# Patient Record
Sex: MALE | Race: WHITE
[De-identification: names, ages, dates, MRNs, and addresses within clinical notes are randomized per-mention and may not be internally consistent; named-entity substitution may affect disease eponyms.]

---

## 2020-07-23 ENCOUNTER — HOSPITAL ENCOUNTER (INPATIENT)
Dept: HOSPITAL 47 - EC | Age: 55
LOS: 6 days | Discharge: NURSING, REHAB OR LONG-TERM CARE | DRG: 871 | End: 2020-07-29
Attending: FAMILY MEDICINE | Admitting: FAMILY MEDICINE
Payer: COMMERCIAL

## 2020-07-23 DIAGNOSIS — Z87.01: ICD-10-CM

## 2020-07-23 DIAGNOSIS — E87.2: ICD-10-CM

## 2020-07-23 DIAGNOSIS — E83.42: ICD-10-CM

## 2020-07-23 DIAGNOSIS — G93.41: ICD-10-CM

## 2020-07-23 DIAGNOSIS — F17.200: ICD-10-CM

## 2020-07-23 DIAGNOSIS — E87.1: ICD-10-CM

## 2020-07-23 DIAGNOSIS — N39.0: ICD-10-CM

## 2020-07-23 DIAGNOSIS — E86.0: ICD-10-CM

## 2020-07-23 DIAGNOSIS — N17.9: ICD-10-CM

## 2020-07-23 DIAGNOSIS — Z96.649: ICD-10-CM

## 2020-07-23 DIAGNOSIS — Z87.11: ICD-10-CM

## 2020-07-23 DIAGNOSIS — Z11.59: ICD-10-CM

## 2020-07-23 DIAGNOSIS — A41.59: Primary | ICD-10-CM

## 2020-07-23 DIAGNOSIS — F10.231: ICD-10-CM

## 2020-07-23 DIAGNOSIS — G31.2: ICD-10-CM

## 2020-07-23 DIAGNOSIS — E87.6: ICD-10-CM

## 2020-07-23 DIAGNOSIS — E86.1: ICD-10-CM

## 2020-07-23 LAB
ALBUMIN SERPL-MCNC: 3.5 G/DL (ref 3.5–5)
ALP SERPL-CCNC: 73 U/L (ref 38–126)
ALT SERPL-CCNC: 33 U/L (ref 4–49)
ANION GAP SERPL CALC-SCNC: 18 MMOL/L
APTT BLD: 21.3 SEC (ref 22–30)
AST SERPL-CCNC: 42 U/L (ref 17–59)
BASOPHILS # BLD AUTO: 0.1 K/UL (ref 0–0.2)
BASOPHILS NFR BLD AUTO: 0 %
BUN SERPL-SCNC: 44 MG/DL (ref 9–20)
CALCIUM SPEC-MCNC: 8.9 MG/DL (ref 8.4–10.2)
CHLORIDE SERPL-SCNC: 83 MMOL/L (ref 98–107)
CO2 SERPL-SCNC: 26 MMOL/L (ref 22–30)
D DIMER PPP FEU-MCNC: 1.56 MG/L FEU (ref ?–0.6)
EOSINOPHIL # BLD AUTO: 0.1 K/UL (ref 0–0.7)
EOSINOPHIL NFR BLD AUTO: 1 %
ERYTHROCYTE [DISTWIDTH] IN BLOOD BY AUTOMATED COUNT: 3.69 M/UL (ref 4.3–5.9)
ERYTHROCYTE [DISTWIDTH] IN BLOOD: 13.6 % (ref 11.5–15.5)
GLUCOSE SERPL-MCNC: 114 MG/DL (ref 74–99)
HCT VFR BLD AUTO: 40.3 % (ref 39–53)
HGB BLD-MCNC: 14 GM/DL (ref 13–17.5)
HYALINE CASTS UR QL AUTO: 8 /LPF (ref 0–2)
INR PPP: 1.1 (ref ?–1.2)
LYMPHOCYTES # SPEC AUTO: 1.9 K/UL (ref 1–4.8)
LYMPHOCYTES NFR SPEC AUTO: 11 %
MAGNESIUM SPEC-SCNC: 1.3 MG/DL (ref 1.6–2.3)
MCH RBC QN AUTO: 37.9 PG (ref 25–35)
MCHC RBC AUTO-ENTMCNC: 34.7 G/DL (ref 31–37)
MCV RBC AUTO: 109.1 FL (ref 80–100)
MONOCYTES # BLD AUTO: 1.5 K/UL (ref 0–1)
MONOCYTES NFR BLD AUTO: 9 %
NEUTROPHILS # BLD AUTO: 12.9 K/UL (ref 1.3–7.7)
NEUTROPHILS NFR BLD AUTO: 78 %
PH UR: 5.5 [PH] (ref 5–8)
PLATELET # BLD AUTO: 242 K/UL (ref 150–450)
POTASSIUM SERPL-SCNC: 2.6 MMOL/L (ref 3.5–5.1)
PROT SERPL-MCNC: 7.2 G/DL (ref 6.3–8.2)
PT BLD: 11.3 SEC (ref 9–12)
SODIUM SERPL-SCNC: 127 MMOL/L (ref 137–145)
SP GR UR: 1.04 (ref 1–1.03)
SQUAMOUS UR QL AUTO: 5 /HPF (ref 0–4)
UROBILINOGEN UR QL STRIP: 3 MG/DL (ref ?–2)
WBC # BLD AUTO: 16.7 K/UL (ref 3.8–10.6)
WBC # UR AUTO: >182 /HPF (ref 0–5)

## 2020-07-23 PROCEDURE — 83880 ASSAY OF NATRIURETIC PEPTIDE: CPT

## 2020-07-23 PROCEDURE — 81001 URINALYSIS AUTO W/SCOPE: CPT

## 2020-07-23 PROCEDURE — 85025 COMPLETE CBC W/AUTO DIFF WBC: CPT

## 2020-07-23 PROCEDURE — 96374 THER/PROPH/DIAG INJ IV PUSH: CPT

## 2020-07-23 PROCEDURE — 82565 ASSAY OF CREATININE: CPT

## 2020-07-23 PROCEDURE — 84132 ASSAY OF SERUM POTASSIUM: CPT

## 2020-07-23 PROCEDURE — 84484 ASSAY OF TROPONIN QUANT: CPT

## 2020-07-23 PROCEDURE — 87086 URINE CULTURE/COLONY COUNT: CPT

## 2020-07-23 PROCEDURE — 84443 ASSAY THYROID STIM HORMONE: CPT

## 2020-07-23 PROCEDURE — 80048 BASIC METABOLIC PNL TOTAL CA: CPT

## 2020-07-23 PROCEDURE — 85379 FIBRIN DEGRADATION QUANT: CPT

## 2020-07-23 PROCEDURE — 83735 ASSAY OF MAGNESIUM: CPT

## 2020-07-23 PROCEDURE — 94640 AIRWAY INHALATION TREATMENT: CPT

## 2020-07-23 PROCEDURE — 80053 COMPREHEN METABOLIC PANEL: CPT

## 2020-07-23 PROCEDURE — 96365 THER/PROPH/DIAG IV INF INIT: CPT

## 2020-07-23 PROCEDURE — 93005 ELECTROCARDIOGRAM TRACING: CPT

## 2020-07-23 PROCEDURE — 71046 X-RAY EXAM CHEST 2 VIEWS: CPT

## 2020-07-23 PROCEDURE — 96361 HYDRATE IV INFUSION ADD-ON: CPT

## 2020-07-23 PROCEDURE — 85610 PROTHROMBIN TIME: CPT

## 2020-07-23 PROCEDURE — 99291 CRITICAL CARE FIRST HOUR: CPT

## 2020-07-23 PROCEDURE — 85027 COMPLETE CBC AUTOMATED: CPT

## 2020-07-23 PROCEDURE — 36415 COLL VENOUS BLD VENIPUNCTURE: CPT

## 2020-07-23 PROCEDURE — 96367 TX/PROPH/DG ADDL SEQ IV INF: CPT

## 2020-07-23 PROCEDURE — 87186 SC STD MICRODIL/AGAR DIL: CPT

## 2020-07-23 PROCEDURE — 96360 HYDRATION IV INFUSION INIT: CPT

## 2020-07-23 PROCEDURE — 96368 THER/DIAG CONCURRENT INF: CPT

## 2020-07-23 PROCEDURE — 96366 THER/PROPH/DIAG IV INF ADDON: CPT

## 2020-07-23 PROCEDURE — 71275 CT ANGIOGRAPHY CHEST: CPT

## 2020-07-23 PROCEDURE — 87040 BLOOD CULTURE FOR BACTERIA: CPT

## 2020-07-23 PROCEDURE — 83605 ASSAY OF LACTIC ACID: CPT

## 2020-07-23 PROCEDURE — 96376 TX/PRO/DX INJ SAME DRUG ADON: CPT

## 2020-07-23 PROCEDURE — 85730 THROMBOPLASTIN TIME PARTIAL: CPT

## 2020-07-23 PROCEDURE — 87077 CULTURE AEROBIC IDENTIFY: CPT

## 2020-07-23 RX ADMIN — POTASSIUM CHLORIDE STA MEQ: 20 TABLET, EXTENDED RELEASE ORAL at 19:12

## 2020-07-23 RX ADMIN — CEFAZOLIN SCH MLS/HR: 330 INJECTION, POWDER, FOR SOLUTION INTRAMUSCULAR; INTRAVENOUS at 21:55

## 2020-07-23 RX ADMIN — POTASSIUM CHLORIDE STA: 20 TABLET, EXTENDED RELEASE ORAL at 20:13

## 2020-07-23 NOTE — ED
General Adult HPI





- General


Chief complaint: Weakness


Stated complaint: weakness


Time Seen by Provider: 07/23/20 15:45


Source: patient, EMS, RN notes reviewed, old records reviewed


Limitations: no limitations





- History of Present Illness


Initial comments: 





This is a 54-year-old male who presents emergency department stating that he 

thinks he has grown a virus because he been short of breath over the last couple

of weeks.  Patient denies any cough.  Patient denies any chest pain or 

palpitations.  [place] and some of

those people could've had coronavirus.  Patient denies any abdominal pain.  

Patient denies nausea vomiting diarrhea.  Patient denies headache patient denies

numbness weakness per patient denies lightheadedness or dizziness.  Patient 

states he also feels a little dehydrated even though he is eating and drinking 

normally.  Patient denies any recent injury or trauma.





- Related Data


                                Home Medications











 Medication  Instructions  Recorded  Confirmed


 


No Known Home Medications  07/23/20 07/23/20











                                    Allergies











Allergy/AdvReac Type Severity Reaction Status Date / Time


 


No Known Allergies Allergy   Verified 07/23/20 17:39














Review of Systems


ROS Statement: 


Those systems with pertinent positive or pertinent negative responses have been 

documented in the HPI.





ROS Other: All systems not noted in ROS Statement are negative.





Past Medical History


Past Medical History: No Reported History


History of Any Multi-Drug Resistant Organisms: None Reported


Past Surgical History: Orthopedic Surgery


Past Psychological History: No Psychological Hx Reported


Smoking Status: Current every day smoker


Past Alcohol Use History: Occasional


Past Drug Use History: Marijuana





General Exam





- General Exam Comments


Initial Comments: 





GENERAL:


Patient is well-developed and well-nourished.  Patient is nontoxic and well-

hydrated and is in mild distress.





ENT:


Neck is soft and supple.  No significant lymphadenopathy is noted.  Oropharynx 

is clear.  Moist mucous membranes.  Neck has full range of motion without 

eliciting any pain.  





EYES:


The sclera were anicteric and conjunctiva were pink and moist.  Extraocular 

movements were intact and pupils were equal round and reactive to light.  

Eyelids were unremarkable.





PULMONARY:


Unlabored respirations.  Good breath sounds bilaterally.  No audible rales 

rhonchi or wheezing was noted.





CARDIOVASCULAR:


There is a regular rate and rhythm without any murmurs gallops or rubs. 





ABDOMEN:


Soft and nontender with normal bowel sounds. 





SKIN:


Skin is clear with no lesions or rashes and otherwise unremarkable.





NEUROLOGIC:


Patient is alert and oriented x3.  Cranial nerves II through XII are grossly 

intact.  Motor and sensory are also intact.  Normal speech, volume and content. 

Symmetrical smile.  





MUSCULOSKELETAL:


Normal extremities with adequate strength and full range of motion.  No lower 

extremity swelling or edema.  No calf tenderness.





LYMPHATICS:


No significant lymphadenopathy is noted





PSYCHIATRIC:


Normal psychiatric evaluation.  


Limitations: no limitations





Course


                                   Vital Signs











  07/23/20 07/23/20 07/23/20





  15:44 17:58 18:08


 


Temperature 98.4 F  


 


Pulse Rate 101 H 95 94


 


Respiratory 17  





Rate   


 


Blood Pressure 109/90  


 


O2 Sat by Pulse 98  





Oximetry   














Medical Decision Making





- Medical Decision Making





EKG shows sinus rhythm with occasional PAC at 99 bpm RI interval 280 QRSs 112 

QTC intervals 408 QTC is 523.  Patient's EKG shows no ST segment elevation or 

depression.





CT of the chest shows no pulmonary edema in no acute abnormality.





Patient urine came back and showed urinary tract infection at 7:45 PM.  Patient 

received antibiotics and a blood culture at this time.  Patient this time was 

considered septic.





I spoke with Dr. Hart he agreed to admit the patient admitted the patient 

wrote admitting orders continued antibiotics on the floor.





Patient received magnesium and potassium in the emergency department.  Patient's

ideal body weight is 78 kg.





- Lab Data


Result diagrams: 


                                 07/23/20 16:05





                                 07/23/20 15:50


                                   Lab Results











  07/23/20 07/23/20 07/23/20 Range/Units





  15:50 15:50 15:50 


 


WBC     (3.8-10.6)  k/uL


 


RBC     (4.30-5.90)  m/uL


 


Hgb     (13.0-17.5)  gm/dL


 


Hct     (39.0-53.0)  %


 


MCV     (80.0-100.0)  fL


 


MCH     (25.0-35.0)  pg


 


MCHC     (31.0-37.0)  g/dL


 


RDW     (11.5-15.5)  %


 


Plt Count     (150-450)  k/uL


 


Neutrophils %     %


 


Lymphocytes %     %


 


Monocytes %     %


 


Eosinophils %     %


 


Basophils %     %


 


Neutrophils #     (1.3-7.7)  k/uL


 


Lymphocytes #     (1.0-4.8)  k/uL


 


Monocytes #     (0-1.0)  k/uL


 


Eosinophils #     (0-0.7)  k/uL


 


Basophils #     (0-0.2)  k/uL


 


Manual Slide Review     


 


Poikilocytosis (manual     


 


Macrocytosis     


 


PT  11.3    (9.0-12.0)  sec


 


INR  1.1    (<1.2)  


 


APTT  21.3 L    (22.0-30.0)  sec


 


D-Dimer  1.56 H    (<0.60)  mg/L FEU


 


Sodium   127 L   (137-145)  mmol/L


 


Potassium   2.6 L*   (3.5-5.1)  mmol/L


 


Chloride   83 L   ()  mmol/L


 


Carbon Dioxide   26   (22-30)  mmol/L


 


Anion Gap   18   mmol/L


 


BUN   44 H   (9-20)  mg/dL


 


Creatinine   1.43 H   (0.66-1.25)  mg/dL


 


Est GFR (CKD-EPI)AfAm   63   (>60 ml/min/1.73 sqM)  


 


Est GFR (CKD-EPI)NonAf   55   (>60 ml/min/1.73 sqM)  


 


Glucose   114 H   (74-99)  mg/dL


 


Lactic Ac Sepsis Rflx     


 


Plasma Lactic Acid Obi    2.3 H*  (0.7-2.0)  mmol/L


 


Calcium   8.9   (8.4-10.2)  mg/dL


 


Magnesium   1.3 L   (1.6-2.3)  mg/dL


 


Total Bilirubin   1.6 H   (0.2-1.3)  mg/dL


 


AST   42   (17-59)  U/L


 


ALT   33   (4-49)  U/L


 


Alkaline Phosphatase   73   ()  U/L


 


Troponin I     (0.000-0.034)  ng/mL


 


NT-Pro-B Natriuret Pep     pg/mL


 


Total Protein   7.2   (6.3-8.2)  g/dL


 


Albumin   3.5   (3.5-5.0)  g/dL


 


Urine Color     


 


Urine Appearance     (Clear)  


 


Urine pH     (5.0-8.0)  


 


Ur Specific Gravity     (1.001-1.035)  


 


Urine Protein     (Negative)  


 


Urine Glucose (UA)     (Negative)  


 


Urine Ketones     (Negative)  


 


Urine Blood     (Negative)  


 


Urine Nitrite     (Negative)  


 


Urine Bilirubin     (Negative)  


 


Urine Urobilinogen     (<2.0)  mg/dL


 


Ur Leukocyte Esterase     (Negative)  


 


Urine WBC     (0-5)  /hpf


 


Urine WBC Clumps     (None)  /hpf


 


Ur Squamous Epith Cells     (0-4)  /hpf


 


Urine Bacteria     (None)  /hpf


 


Hyaline Casts     (0-2)  /lpf


 


Urine Mucus     (None)  /hpf














  07/23/20 07/23/20 07/23/20 Range/Units





  15:50 16:05 16:05 


 


WBC   16.7 H   (3.8-10.6)  k/uL


 


RBC   3.69 L   (4.30-5.90)  m/uL


 


Hgb   14.0   (13.0-17.5)  gm/dL


 


Hct   40.3   (39.0-53.0)  %


 


MCV   109.1 H   (80.0-100.0)  fL


 


MCH   37.9 H   (25.0-35.0)  pg


 


MCHC   34.7   (31.0-37.0)  g/dL


 


RDW   13.6   (11.5-15.5)  %


 


Plt Count   242   (150-450)  k/uL


 


Neutrophils %   78   %


 


Lymphocytes %   11   %


 


Monocytes %   9   %


 


Eosinophils %   1   %


 


Basophils %   0   %


 


Neutrophils #   12.9 H   (1.3-7.7)  k/uL


 


Lymphocytes #   1.9   (1.0-4.8)  k/uL


 


Monocytes #   1.5 H   (0-1.0)  k/uL


 


Eosinophils #   0.1   (0-0.7)  k/uL


 


Basophils #   0.1   (0-0.2)  k/uL


 


Manual Slide Review   Performed   


 


Poikilocytosis (manual   Present   


 


Macrocytosis   Marked A   


 


PT     (9.0-12.0)  sec


 


INR     (<1.2)  


 


APTT     (22.0-30.0)  sec


 


D-Dimer     (<0.60)  mg/L FEU


 


Sodium     (137-145)  mmol/L


 


Potassium     (3.5-5.1)  mmol/L


 


Chloride     ()  mmol/L


 


Carbon Dioxide     (22-30)  mmol/L


 


Anion Gap     mmol/L


 


BUN     (9-20)  mg/dL


 


Creatinine     (0.66-1.25)  mg/dL


 


Est GFR (CKD-EPI)AfAm     (>60 ml/min/1.73 sqM)  


 


Est GFR (CKD-EPI)NonAf     (>60 ml/min/1.73 sqM)  


 


Glucose     (74-99)  mg/dL


 


Lactic Ac Sepsis Rflx     


 


Plasma Lactic Acid Obi     (0.7-2.0)  mmol/L


 


Calcium     (8.4-10.2)  mg/dL


 


Magnesium     (1.6-2.3)  mg/dL


 


Total Bilirubin     (0.2-1.3)  mg/dL


 


AST     (17-59)  U/L


 


ALT     (4-49)  U/L


 


Alkaline Phosphatase     ()  U/L


 


Troponin I  0.013    (0.000-0.034)  ng/mL


 


NT-Pro-B Natriuret Pep    596  pg/mL


 


Total Protein     (6.3-8.2)  g/dL


 


Albumin     (3.5-5.0)  g/dL


 


Urine Color     


 


Urine Appearance     (Clear)  


 


Urine pH     (5.0-8.0)  


 


Ur Specific Gravity     (1.001-1.035)  


 


Urine Protein     (Negative)  


 


Urine Glucose (UA)     (Negative)  


 


Urine Ketones     (Negative)  


 


Urine Blood     (Negative)  


 


Urine Nitrite     (Negative)  


 


Urine Bilirubin     (Negative)  


 


Urine Urobilinogen     (<2.0)  mg/dL


 


Ur Leukocyte Esterase     (Negative)  


 


Urine WBC     (0-5)  /hpf


 


Urine WBC Clumps     (None)  /hpf


 


Ur Squamous Epith Cells     (0-4)  /hpf


 


Urine Bacteria     (None)  /hpf


 


Hyaline Casts     (0-2)  /lpf


 


Urine Mucus     (None)  /hpf














  07/23/20 07/23/20 07/23/20 Range/Units





  16:43 19:07 19:24 


 


WBC     (3.8-10.6)  k/uL


 


RBC     (4.30-5.90)  m/uL


 


Hgb     (13.0-17.5)  gm/dL


 


Hct     (39.0-53.0)  %


 


MCV     (80.0-100.0)  fL


 


MCH     (25.0-35.0)  pg


 


MCHC     (31.0-37.0)  g/dL


 


RDW     (11.5-15.5)  %


 


Plt Count     (150-450)  k/uL


 


Neutrophils %     %


 


Lymphocytes %     %


 


Monocytes %     %


 


Eosinophils %     %


 


Basophils %     %


 


Neutrophils #     (1.3-7.7)  k/uL


 


Lymphocytes #     (1.0-4.8)  k/uL


 


Monocytes #     (0-1.0)  k/uL


 


Eosinophils #     (0-0.7)  k/uL


 


Basophils #     (0-0.2)  k/uL


 


Manual Slide Review     


 


Poikilocytosis (manual     


 


Macrocytosis     


 


PT     (9.0-12.0)  sec


 


INR     (<1.2)  


 


APTT     (22.0-30.0)  sec


 


D-Dimer     (<0.60)  mg/L FEU


 


Sodium     (137-145)  mmol/L


 


Potassium     (3.5-5.1)  mmol/L


 


Chloride     ()  mmol/L


 


Carbon Dioxide     (22-30)  mmol/L


 


Anion Gap     mmol/L


 


BUN     (9-20)  mg/dL


 


Creatinine     (0.66-1.25)  mg/dL


 


Est GFR (CKD-EPI)AfAm     (>60 ml/min/1.73 sqM)  


 


Est GFR (CKD-EPI)NonAf     (>60 ml/min/1.73 sqM)  


 


Glucose     (74-99)  mg/dL


 


Lactic Ac Sepsis Rflx  Y    


 


Plasma Lactic Acid Obi   1.6   (0.7-2.0)  mmol/L


 


Calcium     (8.4-10.2)  mg/dL


 


Magnesium     (1.6-2.3)  mg/dL


 


Total Bilirubin     (0.2-1.3)  mg/dL


 


AST     (17-59)  U/L


 


ALT     (4-49)  U/L


 


Alkaline Phosphatase     ()  U/L


 


Troponin I     (0.000-0.034)  ng/mL


 


NT-Pro-B Natriuret Pep     pg/mL


 


Total Protein     (6.3-8.2)  g/dL


 


Albumin     (3.5-5.0)  g/dL


 


Urine Color    Yellow  


 


Urine Appearance    Cloudy  (Clear)  


 


Urine pH    5.5  (5.0-8.0)  


 


Ur Specific Gravity    1.040 H  (1.001-1.035)  


 


Urine Protein    Trace H  (Negative)  


 


Urine Glucose (UA)    Negative  (Negative)  


 


Urine Ketones    Trace H  (Negative)  


 


Urine Blood    Small H  (Negative)  


 


Urine Nitrite    Negative  (Negative)  


 


Urine Bilirubin    Negative  (Negative)  


 


Urine Urobilinogen    3.0  (<2.0)  mg/dL


 


Ur Leukocyte Esterase    Large H  (Negative)  


 


Urine WBC    >182 H  (0-5)  /hpf


 


Urine WBC Clumps    Many H  (None)  /hpf


 


Ur Squamous Epith Cells    5 H  (0-4)  /hpf


 


Urine Bacteria    Occasional H  (None)  /hpf


 


Hyaline Casts    8 H  (0-2)  /lpf


 


Urine Mucus    Rare H  (None)  /hpf














Critical Care Time


Critical Care Time: Yes


Total Critical Care Time: 35





Disposition


Clinical Impression: 


 Urinary tract infection, Sepsis, Hypokalemia, Hypomagnesemia





Disposition: ADMITTED AS IP TO THIS HOSP


Referrals: 


None,Stated [Primary Care Provider] - 1-2 days


Time of Disposition: 20:25

## 2020-07-23 NOTE — CT
EXAMINATION TYPE: CT chest angio for PE with contrast and with 3-D reconstruction renderings

 

DATE OF EXAM: 7/23/2020

 

COMPARISON: Chest x-ray 7/23/2020

 

HISTORY: elevated d-dimer, SOB, fatigue

 

TECHNIQUE: Departmental protocol. Automated exposure control for dose reduction was used.

CT DLP: 476.8 mGycm

 

CONTRAST: CT Chest for pulmonary embolism performed with with IV Contrast, patient injected with 73cc
 mL of Isovue 370.

 

FINDINGS:

 

LUNGS: The airways unremarkable. The left lung is clear and well expanded. The right lung shows scatt
ered ill-defined bands of added opacity in the upper and mid and lower lung zone, most notable in the
 lower lung zone. These are consistent with pulmonary scarring changes. If there is an outside prior 
CT, with advise direct comparison for an addendum to be made to this report. If none exist, then six-
month follow-up CT characterization is recommended.

 

PLEURAL SPACES: There is no pleural effusion or pneumothorax seen.  

 

MEDIASTINUM: There is satisfactory enhancement of the pulmonary artery and its branches, without defi
nite CT evidence of pulmonary embolism. The aorta is unremarkable. There is evidence of moderate left
 ventricular hypertrophy. There is no cardiomegaly or pericardial effusion, but prominent left and ri
ght coronary calcifications are demonstrated. There are no greater than 1 cm hilar or mediastinal lym
ph nodes.   No pericardial effusion is seen.  

 

OTHER:  No additional significant abnormality is seen.

 

IMPRESSION:

1.  Negative for pulmonary embolism.

2.  No acute pulmonary/pleural process/mediastinal.

3.  Coronary calcifications with left ventricular hypertrophy.

4.  Right lung parenchyma scarring, with six-month follow-up chest CT is advised unless direct compar
maeve can be made to a prior CT.

## 2020-07-23 NOTE — XR
EXAMINATION: XR chest 2V

DATE AND TIME:  7/23/2020 4:58 PM

 

CLINICAL INDICATION: PHH; difficulty breathing

 

TECHNIQUE: Departmental protocol

 

COMPARISON: None

 

FINDINGS: 

The lungs appear negative for acute processes.

 

There appear to be two surgical clips which reject within the right lower lobe posterolaterally.

 

The right pleural space shows blunting of the costophrenic angle on the right, with differential incl
uding pleural thickening and/or scarring, versus small pleural effusion. However, the lateral view do
es not confirm pleural effusion and, therefore, the finding is likely the former.

 

The cardiac silhouette is not enlarged. The remainder of the mediastinal silhouette is unremarkable. 


 

The skeletal structures and soft tissues are negative for acute findings.

 

IMPRESSION:

1. No acute pulmonary/pleural process.

2. Subtle right pleural space finding, likely representing peripheral thickening and/or scarring, edd
tomically related to surgical clips.

## 2020-07-24 RX ADMIN — CEFAZOLIN SCH MLS/HR: 330 INJECTION, POWDER, FOR SOLUTION INTRAMUSCULAR; INTRAVENOUS at 20:13

## 2020-07-24 RX ADMIN — SODIUM CHLORIDE SCH MLS/HR: 9 INJECTION, SOLUTION INTRAVENOUS at 23:36

## 2020-07-24 RX ADMIN — CEFAZOLIN SCH MLS/HR: 330 INJECTION, POWDER, FOR SOLUTION INTRAMUSCULAR; INTRAVENOUS at 05:53

## 2020-07-24 RX ADMIN — SODIUM CHLORIDE SCH MLS/HR: 9 INJECTION, SOLUTION INTRAVENOUS at 10:05

## 2020-07-24 RX ADMIN — CEFAZOLIN SCH: 330 INJECTION, POWDER, FOR SOLUTION INTRAMUSCULAR; INTRAVENOUS at 17:46

## 2020-07-24 NOTE — HP
HISTORY AND PHYSICAL



A 54-year-old white male presents to the ER, short of breath over the last couple

weeks, thought he had a virus.  Denies any cough.  CT of the chest was negative.  He

was around a lot of people that could have coronavirus.  Denies abdominal pain, nausea,

vomiting, diarrhea.  He denied numbness or weakness or lightheaded,  dizziness.  He

feels a little dehydrated.



HOME MEDICINES:

Negative.



ALLERGIES:

Negative.



Current every day smoker.  Occasional marijuana.



REVIEW OF SYSTEMS:

Fourteen-point review of systems negative.



PHYSICAL EXAMINATION:

Well developed, well nourished.

HEENT: Neck supple.  No mass.  Pupils equal, round, reactive.

LUNGS:  Good breath sounds.  No audible wheezes.

CARDIAC:  Regular rate and rhythm.

ABDOMEN:  Soft.

SKIN:  Warm and dry.

Cranial nerves are intact neurologically.

PSYCH: Fair mood and affect.



Pulse is 95 to 100, temp 98.4, respiratory rate 16 to 18, blood pressure 109/90, O2

saturation 98.



EKG sinus rhythm, no ST changes.  CTA chest, no PE.  UA shows severely high amount of

white cells over 120.  Blood cultures are being done.



ASSESSMENT:

1. Urosepsis.

2. Hypokalemia.

3. Lactic acidosis.

4. Hyponatremia.

5. Leukocytosis secondary to urosepsis.

6. Prerenal renal insufficiency with dehydration.

Broad-spectrum antibiotics, fluids.  Monitor labs.  Infectious Disease consult.

Possible discharge if patient improves in the next couple days.  He has sepsis due to

UTI, hypokalemia, hypomagnesemia, replace electrolytes.





MMODL / IJN: [id number] / Job#: [id number]

## 2020-07-25 LAB
ANION GAP SERPL CALC-SCNC: 9 MMOL/L
BUN SERPL-SCNC: 21 MG/DL (ref 9–20)
CALCIUM SPEC-MCNC: 8.3 MG/DL (ref 8.4–10.2)
CHLORIDE SERPL-SCNC: 99 MMOL/L (ref 98–107)
CO2 SERPL-SCNC: 25 MMOL/L (ref 22–30)
GLUCOSE SERPL-MCNC: 80 MG/DL (ref 74–99)
MAGNESIUM SPEC-SCNC: 1.4 MG/DL (ref 1.6–2.3)
POTASSIUM SERPL-SCNC: 2.8 MMOL/L (ref 3.5–5.1)
SODIUM SERPL-SCNC: 133 MMOL/L (ref 137–145)

## 2020-07-25 RX ADMIN — CEFAZOLIN SCH MLS/HR: 330 INJECTION, POWDER, FOR SOLUTION INTRAMUSCULAR; INTRAVENOUS at 11:51

## 2020-07-25 RX ADMIN — POTASSIUM CHLORIDE SCH MEQ: 20 TABLET, EXTENDED RELEASE ORAL at 16:09

## 2020-07-25 RX ADMIN — MAGNESIUM SULFATE IN DEXTROSE SCH MLS/HR: 10 INJECTION, SOLUTION INTRAVENOUS at 14:43

## 2020-07-25 RX ADMIN — CEFAZOLIN SCH MLS/HR: 330 INJECTION, POWDER, FOR SOLUTION INTRAMUSCULAR; INTRAVENOUS at 03:51

## 2020-07-25 RX ADMIN — CEFAZOLIN SCH MLS/HR: 330 INJECTION, POWDER, FOR SOLUTION INTRAMUSCULAR; INTRAVENOUS at 17:43

## 2020-07-25 RX ADMIN — MAGNESIUM SULFATE IN DEXTROSE SCH MLS/HR: 10 INJECTION, SOLUTION INTRAVENOUS at 17:39

## 2020-07-25 RX ADMIN — Medication SCH MG: at 16:09

## 2020-07-25 RX ADMIN — POTASSIUM CHLORIDE SCH MEQ: 20 TABLET, EXTENDED RELEASE ORAL at 14:43

## 2020-07-25 RX ADMIN — MAGNESIUM SULFATE IN DEXTROSE SCH MLS/HR: 10 INJECTION, SOLUTION INTRAVENOUS at 16:09

## 2020-07-25 NOTE — P.PN
Subjective


Progress Note Date: 07/25/20


Principal diagnosis: 





this is a 54-year-old male who was recently admitted with shortness of breath 

and possible acute urinary tract infection and is being closely monitored.  

Cultures preliminary showing gram-negative bacilli and currently maintained on 

IV antibiotics in the form of ceftriaxone and will continue at this time. 

infectious disease is following. patient is also found to be hypokalemic and 

have hypomagnesemia and currently being replaced.  Will repeat a.m. labs and 

monitor closely. only patient denies any chest pain, worsening shortness of 

breath, or palpitations.  patient is afebrile. no reports of nausea or vomiting 

and patient is tolerating diet.





Objective





- Vital Signs


Vital signs: 


                                   Vital Signs











Temp  97.6 F   07/25/20 07:53


 


Pulse  101 H  07/25/20 07:53


 


Resp  18   07/25/20 07:53


 


BP  169/101   07/25/20 07:53


 


Pulse Ox  97   07/25/20 07:53








                                 Intake & Output











 07/24/20 07/25/20 07/25/20





 18:59 06:59 18:59


 


Intake Total  1800 


 


Output Total  3 


 


Balance  1797 


 


Weight 127.006 kg  


 


Intake:   


 


  Intake, IV Titration  1500 





  Amount   


 


    Sodium Chloride 0.9% 1,  1000 





    000 ml @ 130 mls/hr IV .   





    Q7H42M [facility] Rx#:[id number]   


 


    Vancomycin 2,000 mg In  500 





    Sodium Chloride 0.9% 500   





    ml 500 ml @ 167 mls/hr   





    IVPB Q16H [facility]AC[facility] Rx#:   





    [id number]   


 


  Oral  300 


 


Output:   


 


  Urine  3 


 


Other:   


 


  Voiding Method   Incontinent


 


  # Voids  3 














- Exam





Gen: This is a 54-year-old male sitting up in bed, awake, alert and oriented 3,

well-developed, well-nourished.


HEENT: Head is atraumatic, normocephalic. Pupils equal, round. Sclerae is 

anicteric. 


NECK: Supple. No JVD. No lymphadenopathy. No thyromegaly. 


LUNGS: diminished breath sounds bilaterally noted with no wheezing or rhonchi 

noted.  No intercostal retractions.


HEART: S1, S2 


ABDOMEN: Soft. Bowel sounds are present. No masses.  No tenderness.


EXTREMITIES: No pedal edema.  No calf tenderness.


NEUROLOGICAL: Patient is awake, alert and oriented x3. Cranial nerves 2 through 

12 are grossly intact. 





- Labs


CBC & Chem 7: 


                                 07/23/20 16:05





                                 07/25/20 08:14


Labs: 


                  Abnormal Lab Results - Last 24 Hours (Table)











  07/25/20 Range/Units





  08:14 


 


Sodium  133 L  (137-145)  mmol/L


 


Potassium  2.8 L  (3.5-5.1)  mmol/L


 


BUN  21 H  (9-20)  mg/dL


 


Calcium  8.3 L  (8.4-10.2)  mg/dL


 


Magnesium  1.4 L  (1.6-2.3)  mg/dL








                      Microbiology - Last 24 Hours (Table)











 07/23/20 19:24 Urine Culture - Preliminary





 Urine,Voided    Gram Neg Bacilli


 


 07/23/20 17:43 Blood Culture - Preliminary





 Blood    No Growth after 24 hours














Assessment and Plan


Assessment: 





urosepsis, present on admission


Hypokalemia


Lactic acidosis


Hyponatremia


hypomagnesemia


Leukocytosis secondary to urosepsis


Prerenal renal insufficiency with dehydration





plan:


Continue current medications, management, and symptomatic treatment.  continue 

to monitor electrolytes and replace as needed. will repeat a.m. labs. continue 

with IV fluid hydration.  Further recommendations to follow.

## 2020-07-25 NOTE — P.CONS
History of Present Illness





- Reason for Consult


Consult date: 07/24/20


Urosepsis


Requesting physician: Uziel Hart





- Chief Complaint


Weakness x few days





- History of Present Illness


Patient is a 54-year-old  male presenting to the hospital with 

generalized weakness and thinking he may have corona virus as he was marked with

a bunch of people that have been infected however the patient denies having any 

fever or any chills on arrival to ER no shortness of breath no cough patient on 

arrival to the ER was noticed to be afebrile he did have elevated white count of

16,000 with a left shift no lymphopenia elevated d-dimer he did have a positive 

UA patient chest x-ray was negative subsequently did have CT angiogram that was 

negative for PE and did not show any groundglass opacities patient did have a 

nasopharyngeal swab which came back negative as well , Patient has been 

diagnosed with alcohol withdrawal DTs and has been started on Ativan, patient 

has received a few doses and at time of evaluation was completely out and was 

unable to provide any history so most information has been obtained from review 

the chart patient has been started on Rocephin and vancomycin and infectious 

disease was consulted with concern for possible urosepsis








Review of Systems


Positive points has been mentioned in HPI complete review could not be obtained 

because of his underlying mental status








Past Medical History


Past Medical History: Pneumonia


Additional Past Medical History / Comment(s): gastric ulcer, ETOH-liquor, beer, 

daily


History of Any Multi-Drug Resistant Organisms: None Reported


Past Surgical History: Joint Replacement, Orthopedic Surgery


Additional Past Surgical History / Comment(s): total hip (unsure of which one), 

closed reductions, recent weight loss


Past Psychological History: No Psychological Hx Reported


Smoking Status: Current every day smoker


Past Alcohol Use History: Occasional


Past Drug Use History: Marijuana





Medications and Allergies


                                Home Medications











 Medication  Instructions  Recorded  Confirmed  Type


 


No Known Home Medications  07/23/20 07/23/20 History








                                    Allergies











Allergy/AdvReac Type Severity Reaction Status Date / Time


 


No Known Allergies Allergy   Verified 07/23/20 17:39














Physical Exam


Vitals: 


                                   Vital Signs











  Temp Pulse Pulse Resp BP BP Pulse Ox


 


 07/24/20 13:22  97.8 F   100  16   103/68  96


 


 07/24/20 11:15  96.9 F L   101 H  16   166/122  97


 


 07/24/20 08:50    99  16   157/101  97


 


 07/24/20 07:50  97.4 F L      


 


 07/24/20 02:30   91   18  127/84   98


 


 07/23/20 21:58  98.3 F  90   18  155/95   97


 


 07/23/20 18:08   94     


 


 07/23/20 17:58   95     


 


 07/23/20 15:44  98.4 F  101 H   17  109/90   98








                                Intake and Output











 07/24/20 07/24/20 07/24/20





 06:59 14:59 22:59


 


Other:   


 


  Weight  127.006 kg 











GENERAL DESCRIPTION: Middle-aged male lying in bed, no distress. No tachypnea or

accessory muscle of respiration use.


HEENT: Shows Pallor , no scleral icterus. Oral mucous membrane is dry. No 

pharyngeal erythema or thrush


NECK: Trachea central, no thyromegaly.


LUNGS: Unlabored breathing. Clear to auscultation anteriorly. No wheeze or 

crackle.


HEART: S1, S2, regular rate and rhythm. No loud murmur


ABDOMEN: Soft, no tenderness , guarding or rigidity, no organomegaly


EXTREMITIES: No edema of feet.


SKIN: No rash, no masses palpable.


NEUROLOGICAL: The patient is sedated orientation could not be determined

















Results


CBC & Chem 7: 


                                 07/23/20 16:05





                                 07/23/20 15:50


Labs: 


                  Abnormal Lab Results - Last 24 Hours (Table)











  07/23/20 07/23/20 07/23/20 Range/Units





  15:50 15:50 15:50 


 


WBC     (3.8-10.6)  k/uL


 


RBC     (4.30-5.90)  m/uL


 


MCV     (80.0-100.0)  fL


 


MCH     (25.0-35.0)  pg


 


Neutrophils #     (1.3-7.7)  k/uL


 


Monocytes #     (0-1.0)  k/uL


 


Macrocytosis     


 


APTT  21.3 L    (22.0-30.0)  sec


 


D-Dimer  1.56 H    (<0.60)  mg/L FEU


 


Sodium   127 L   (137-145)  mmol/L


 


Potassium   2.6 L*   (3.5-5.1)  mmol/L


 


Chloride   83 L   ()  mmol/L


 


BUN   44 H   (9-20)  mg/dL


 


Creatinine   1.43 H   (0.66-1.25)  mg/dL


 


Glucose   114 H   (74-99)  mg/dL


 


Plasma Lactic Acid Obi    2.3 H*  (0.7-2.0)  mmol/L


 


Magnesium   1.3 L   (1.6-2.3)  mg/dL


 


Total Bilirubin   1.6 H   (0.2-1.3)  mg/dL


 


Ur Specific Gravity     (1.001-1.035)  


 


Urine Protein     (Negative)  


 


Urine Ketones     (Negative)  


 


Urine Blood     (Negative)  


 


Ur Leukocyte Esterase     (Negative)  


 


Urine WBC     (0-5)  /hpf


 


Urine WBC Clumps     (None)  /hpf


 


Ur Squamous Epith Cells     (0-4)  /hpf


 


Urine Bacteria     (None)  /hpf


 


Hyaline Casts     (0-2)  /lpf


 


Urine Mucus     (None)  /hpf














  07/23/20 07/23/20 Range/Units





  16:05 19:24 


 


WBC  16.7 H   (3.8-10.6)  k/uL


 


RBC  3.69 L   (4.30-5.90)  m/uL


 


MCV  109.1 H   (80.0-100.0)  fL


 


MCH  37.9 H   (25.0-35.0)  pg


 


Neutrophils #  12.9 H   (1.3-7.7)  k/uL


 


Monocytes #  1.5 H   (0-1.0)  k/uL


 


Macrocytosis  Marked A   


 


APTT    (22.0-30.0)  sec


 


D-Dimer    (<0.60)  mg/L FEU


 


Sodium    (137-145)  mmol/L


 


Potassium    (3.5-5.1)  mmol/L


 


Chloride    ()  mmol/L


 


BUN    (9-20)  mg/dL


 


Creatinine    (0.66-1.25)  mg/dL


 


Glucose    (74-99)  mg/dL


 


Plasma Lactic Acid Obi    (0.7-2.0)  mmol/L


 


Magnesium    (1.6-2.3)  mg/dL


 


Total Bilirubin    (0.2-1.3)  mg/dL


 


Ur Specific Gravity   1.040 H  (1.001-1.035)  


 


Urine Protein   Trace H  (Negative)  


 


Urine Ketones   Trace H  (Negative)  


 


Urine Blood   Small H  (Negative)  


 


Ur Leukocyte Esterase   Large H  (Negative)  


 


Urine WBC   >182 H  (0-5)  /hpf


 


Urine WBC Clumps   Many H  (None)  /hpf


 


Ur Squamous Epith Cells   5 H  (0-4)  /hpf


 


Urine Bacteria   Occasional H  (None)  /hpf


 


Hyaline Casts   8 H  (0-2)  /lpf


 


Urine Mucus   Rare H  (None)  /hpf








                      Microbiology - Last 24 Hours (Table)











 07/23/20 19:24 Urine Culture - Preliminary





 Urine,Voided 














Assessment and Plan


Assessment: 


1- patient presented to the hospital with weakness in this patient did have 

significantly elevated white count he did have a positive UA with concern for 

symptomatic urinary tract infection patient currently undergoing DTs and has 

been sedated cerebral for a history however workup so far including a chest x-

ray and CT angiogram that has been negative for PE or pneumonia abdominal soft 

with clinical examination and no evidence of any cellulitis





(1) Urinary tract infection


Current Visit: Yes   Status: Acute   Code(s): N39.0 - URINARY TRACT INFECTION, 

SITE NOT SPECIFIED   SNOMED Code(s): 56969083


   


Plan: 


1- Rocephin 1 g IV piggyback daily


2- discontinue vancomycin


3- IV fluids


We will follow on clinical condition and cultures to further adjust medication 

if needed


Thank you for this consultation will follow this patient with you





Time with Patient: Greater than 30

## 2020-07-26 LAB
ANION GAP SERPL CALC-SCNC: 9 MMOL/L
BUN SERPL-SCNC: 13 MG/DL (ref 9–20)
CALCIUM SPEC-MCNC: 8.1 MG/DL (ref 8.4–10.2)
CHLORIDE SERPL-SCNC: 104 MMOL/L (ref 98–107)
CO2 SERPL-SCNC: 21 MMOL/L (ref 22–30)
ERYTHROCYTE [DISTWIDTH] IN BLOOD BY AUTOMATED COUNT: 4.47 M/UL (ref 4.3–5.9)
ERYTHROCYTE [DISTWIDTH] IN BLOOD: 13.8 % (ref 11.5–15.5)
GLUCOSE SERPL-MCNC: 61 MG/DL (ref 74–99)
HCT VFR BLD AUTO: 51.3 % (ref 39–53)
HGB BLD-MCNC: 17.7 GM/DL (ref 13–17.5)
MAGNESIUM SPEC-SCNC: 1.6 MG/DL (ref 1.6–2.3)
MCH RBC QN AUTO: 39.6 PG (ref 25–35)
MCHC RBC AUTO-ENTMCNC: 34.5 G/DL (ref 31–37)
MCV RBC AUTO: 114.8 FL (ref 80–100)
PLATELET # BLD AUTO: 117 K/UL (ref 150–450)
POTASSIUM SERPL-SCNC: 4 MMOL/L (ref 3.5–5.1)
SODIUM SERPL-SCNC: 134 MMOL/L (ref 137–145)
WBC # BLD AUTO: 8.7 K/UL (ref 3.8–10.6)

## 2020-07-26 RX ADMIN — CEFAZOLIN SCH MLS/HR: 330 INJECTION, POWDER, FOR SOLUTION INTRAMUSCULAR; INTRAVENOUS at 15:03

## 2020-07-26 RX ADMIN — Medication SCH MG: at 08:48

## 2020-07-26 RX ADMIN — Medication SCH MG: at 17:26

## 2020-07-26 RX ADMIN — MAGNESIUM SULFATE IN DEXTROSE SCH MLS/HR: 10 INJECTION, SOLUTION INTRAVENOUS at 10:52

## 2020-07-26 RX ADMIN — ACETAMINOPHEN PRN MG: 325 TABLET, FILM COATED ORAL at 09:01

## 2020-07-26 RX ADMIN — MAGNESIUM SULFATE IN DEXTROSE SCH MLS/HR: 10 INJECTION, SOLUTION INTRAVENOUS at 12:34

## 2020-07-26 RX ADMIN — CEFAZOLIN SCH MLS/HR: 330 INJECTION, POWDER, FOR SOLUTION INTRAMUSCULAR; INTRAVENOUS at 06:58

## 2020-07-26 NOTE — PN
PROGRESS NOTE



DATE OF SERVICE:

07/25/2020



REASON FOR FOLLOWUP:

Urinary tract infection.



INTERVAL HISTORY:

Patient is currently afebrile.  The patient seemed to be slightly more awake and alert

today. He is breathing comfortably.  Denies any headache.  No chest pain.  No abdominal

pain or diarrhea.



PHYSICAL EXAMINATION:

Blood pressure 165/89 with a pulse of 87, temperature 98.1, he is 98% on room air.

General description is a middle-aged male lying in bed in no distress. Respiratory

system: Unlabored breathing. Clear to auscultation anteriorly. Heart S1, S2, regular

rate and rhythm. Abdomen is soft, no tenderness.



LABS:

Urine showing gram-negative.



DIAGNOSTIC IMPRESSION AND PLAN:

Patient with gram-negative urinary tract infection.  Patient is covered with Rocephin.

To continue adjusting further based on culture report and monitor clinical course

closely.





MMODL / IJN: [id number] / Job#: [id number]

## 2020-07-27 LAB
ALBUMIN SERPL-MCNC: 2.4 G/DL (ref 3.5–5)
ALP SERPL-CCNC: 53 U/L (ref 38–126)
ALT SERPL-CCNC: 21 U/L (ref 4–49)
ANION GAP SERPL CALC-SCNC: 7 MMOL/L
AST SERPL-CCNC: 35 U/L (ref 17–59)
BASOPHILS # BLD AUTO: 0 K/UL (ref 0–0.2)
BASOPHILS NFR BLD AUTO: 1 %
BUN SERPL-SCNC: 8 MG/DL (ref 9–20)
CALCIUM SPEC-MCNC: 7.9 MG/DL (ref 8.4–10.2)
CHLORIDE SERPL-SCNC: 101 MMOL/L (ref 98–107)
CO2 SERPL-SCNC: 26 MMOL/L (ref 22–30)
EOSINOPHIL # BLD AUTO: 0.2 K/UL (ref 0–0.7)
EOSINOPHIL NFR BLD AUTO: 3 %
ERYTHROCYTE [DISTWIDTH] IN BLOOD BY AUTOMATED COUNT: 3.17 M/UL (ref 4.3–5.9)
ERYTHROCYTE [DISTWIDTH] IN BLOOD: 13.9 % (ref 11.5–15.5)
GLUCOSE SERPL-MCNC: 65 MG/DL (ref 74–99)
HCT VFR BLD AUTO: 36.5 % (ref 39–53)
HGB BLD-MCNC: 12.5 GM/DL (ref 13–17.5)
LYMPHOCYTES # SPEC AUTO: 1.7 K/UL (ref 1–4.8)
LYMPHOCYTES NFR SPEC AUTO: 23 %
MAGNESIUM SPEC-SCNC: 1.4 MG/DL (ref 1.6–2.3)
MCH RBC QN AUTO: 39.3 PG (ref 25–35)
MCHC RBC AUTO-ENTMCNC: 34.1 G/DL (ref 31–37)
MCV RBC AUTO: 115.2 FL (ref 80–100)
MONOCYTES # BLD AUTO: 0.5 K/UL (ref 0–1)
MONOCYTES NFR BLD AUTO: 7 %
NEUTROPHILS # BLD AUTO: 4.7 K/UL (ref 1.3–7.7)
NEUTROPHILS NFR BLD AUTO: 66 %
PLATELET # BLD AUTO: 166 K/UL (ref 150–450)
POTASSIUM SERPL-SCNC: 3.1 MMOL/L (ref 3.5–5.1)
PROT SERPL-MCNC: 5.6 G/DL (ref 6.3–8.2)
SODIUM SERPL-SCNC: 134 MMOL/L (ref 137–145)
WBC # BLD AUTO: 7.1 K/UL (ref 3.8–10.6)

## 2020-07-27 RX ADMIN — CEFAZOLIN SCH MLS/HR: 330 INJECTION, POWDER, FOR SOLUTION INTRAMUSCULAR; INTRAVENOUS at 19:56

## 2020-07-27 RX ADMIN — CEFAZOLIN SCH: 330 INJECTION, POWDER, FOR SOLUTION INTRAMUSCULAR; INTRAVENOUS at 02:53

## 2020-07-27 RX ADMIN — CIPROFLOXACIN SCH MG: 500 TABLET, FILM COATED ORAL at 19:55

## 2020-07-27 RX ADMIN — MAGNESIUM SULFATE IN DEXTROSE SCH MLS/HR: 10 INJECTION, SOLUTION INTRAVENOUS at 13:27

## 2020-07-27 RX ADMIN — Medication SCH MG: at 07:53

## 2020-07-27 RX ADMIN — MAGNESIUM SULFATE IN DEXTROSE SCH MLS/HR: 10 INJECTION, SOLUTION INTRAVENOUS at 14:28

## 2020-07-27 RX ADMIN — Medication SCH MG: at 16:22

## 2020-07-27 RX ADMIN — CIPROFLOXACIN SCH MG: 500 TABLET, FILM COATED ORAL at 09:11

## 2020-07-27 RX ADMIN — POTASSIUM CHLORIDE SCH MEQ: 20 TABLET, EXTENDED RELEASE ORAL at 14:27

## 2020-07-27 RX ADMIN — POTASSIUM CHLORIDE SCH MEQ: 20 TABLET, EXTENDED RELEASE ORAL at 13:27

## 2020-07-27 RX ADMIN — CEFAZOLIN SCH MLS/HR: 330 INJECTION, POWDER, FOR SOLUTION INTRAMUSCULAR; INTRAVENOUS at 09:11

## 2020-07-27 RX ADMIN — MAGNESIUM SULFATE IN DEXTROSE SCH MLS/HR: 10 INJECTION, SOLUTION INTRAVENOUS at 16:23

## 2020-07-27 NOTE — PN
PROGRESS NOTE



This is a 54-year-old white male with alcohol encephalopathy, UTI, sepsis,

leukocytosis.  No chest pain or shortness of breath. No lightheadedness or syncope.



Vital signs are stable, afebrile.

CARDIOVASCULAR: S1, S2.

LUNGS: Clear.

GI: Soft.

HEMATOLOGY: Negative Homans.



ASSESSMENT:

1. Urinary tract infection.

2. Sepsis.

3. Leukocytosis.

4. Alcoholic encephalopathy.

5. Alcohol dependence.

Continue with current treatment. Antibiotics, alcohol withdrawal treatment. Follow up

in the next 24 to 48 hours.  Possible discharge.





MMODL / IJN: [id number] / Job#: [id number]

## 2020-07-27 NOTE — PN
PROGRESS NOTE



DATE OF SERVICE:

07/27/2020



REASON FOR FOLLOWUP:

Urinary tract infection.



INTERVAL HISTORY:

The patient is currently afebrile.  The patient is breathing comfortably. The patient

denies having any chest pain or shortness of breath or cough. No nausea, no vomiting,

no abdominal pain or diarrhea.



PHYSICAL EXAMINATION:

Blood pressure 142/87, pulse 75, temperature 97.7. He is 97% on room air.

General description is a young male lying in bed in no distress.

RESPIRATORY SYSTEM: Unlabored breathing. Clear to auscultation anteriorly.

HEART: S1, S2.  Regular rate and rhythm.

ABDOMEN: Soft. No tenderness.



LABS:

Hemoglobin is 10.5, white count 7.1, creatinine 0.61.  Urine with Klebsiella.



DIAGNOSTIC IMPRESSION AND PLAN:

Patient with Klebsiella urinary tract infection.  The patient is currently covered with

Rocephin.  Antibiotic has been switched to Cipro as well.  To discontinue the Rocephin

and monitor clinical course closely.





MMODL / IJN: [id number] / Job#: [id number]

## 2020-07-27 NOTE — PN
PROGRESS NOTE



DATE OF SERVICE:

07/26/2020



REASON FOR FOLLOWUP:

Klebsiella urinary tract infection.



INTERVAL HISTORY:

The patient is currently afebrile.  The patient is breathing comfortably. He is more

awake and alert. Denies having any headache.  No chest pain, shortness of breath or

cough.  No abdominal pain or diarrhea.



PHYSICAL EXAMINATION:

Blood pressure 167/95 with a pulse of 83, temperature 98. He is 99% on room air.

General description is a middle age male lying in bed in no distress.

RESPIRATORY SYSTEM: Unlabored breathing, clear to auscultation anteriorly.

HEART: S1, S2.  Regular rate and rhythm.

ABDOMEN:  Soft, no tenderness.



LABS:

Hemoglobin is 17.7, white count 8.7, creatinine 0.66.  Urine showing Klebsiella

pneumoniae sensitive pathogen.



DIAGNOSTIC IMPRESSION AND PLAN:

Patient with Klebsiella urinary tract infection.  Patient is covered with Rocephin to

continue, finish therapy with oral Cipro on discharge.  Monitor clinical course

closely.





MMODL / IJN: [id number] / Job#: [id number]

## 2020-07-28 LAB
ALBUMIN SERPL-MCNC: 2.4 G/DL (ref 3.5–5)
ALP SERPL-CCNC: 50 U/L (ref 38–126)
ALT SERPL-CCNC: 20 U/L (ref 4–49)
ANION GAP SERPL CALC-SCNC: 6 MMOL/L
AST SERPL-CCNC: 30 U/L (ref 17–59)
BUN SERPL-SCNC: 6 MG/DL (ref 9–20)
CALCIUM SPEC-MCNC: 7.6 MG/DL (ref 8.4–10.2)
CELLS COUNTED: 100
CHLORIDE SERPL-SCNC: 105 MMOL/L (ref 98–107)
CO2 SERPL-SCNC: 25 MMOL/L (ref 22–30)
EOSINOPHIL # BLD MANUAL: 0.08 K/UL (ref 0–0.7)
ERYTHROCYTE [DISTWIDTH] IN BLOOD BY AUTOMATED COUNT: 3.01 M/UL (ref 4.3–5.9)
ERYTHROCYTE [DISTWIDTH] IN BLOOD: 13.7 % (ref 11.5–15.5)
GLUCOSE SERPL-MCNC: 77 MG/DL (ref 74–99)
HCT VFR BLD AUTO: 33.8 % (ref 39–53)
HGB BLD-MCNC: 11.4 GM/DL (ref 13–17.5)
LYMPHOCYTES # BLD MANUAL: 1.66 K/UL (ref 1–4.8)
MAGNESIUM SPEC-SCNC: 1.5 MG/DL (ref 1.6–2.3)
MCH RBC QN AUTO: 37.8 PG (ref 25–35)
MCHC RBC AUTO-ENTMCNC: 33.7 G/DL (ref 31–37)
MCV RBC AUTO: 112.1 FL (ref 80–100)
MONOCYTES # BLD MANUAL: 0.75 K/UL (ref 0–1)
NEUTROPHILS NFR BLD MANUAL: 70 %
NEUTS SEG # BLD MANUAL: 5.81 K/UL (ref 1.3–7.7)
PLATELET # BLD AUTO: 175 K/UL (ref 150–450)
POTASSIUM SERPL-SCNC: 3 MMOL/L (ref 3.5–5.1)
PROT SERPL-MCNC: 5.5 G/DL (ref 6.3–8.2)
SODIUM SERPL-SCNC: 136 MMOL/L (ref 137–145)
WBC # BLD AUTO: 8.3 K/UL (ref 3.8–10.6)

## 2020-07-28 RX ADMIN — POTASSIUM CHLORIDE SCH MEQ: 20 TABLET, EXTENDED RELEASE ORAL at 12:06

## 2020-07-28 RX ADMIN — ACETAMINOPHEN PRN MG: 325 TABLET, FILM COATED ORAL at 18:11

## 2020-07-28 RX ADMIN — ACETAMINOPHEN PRN MG: 325 TABLET, FILM COATED ORAL at 23:31

## 2020-07-28 RX ADMIN — CEFAZOLIN SCH MLS/HR: 330 INJECTION, POWDER, FOR SOLUTION INTRAMUSCULAR; INTRAVENOUS at 15:50

## 2020-07-28 RX ADMIN — Medication SCH MG: at 18:10

## 2020-07-28 RX ADMIN — CIPROFLOXACIN SCH MG: 500 TABLET, FILM COATED ORAL at 21:00

## 2020-07-28 RX ADMIN — ACETAMINOPHEN PRN MG: 325 TABLET, FILM COATED ORAL at 02:46

## 2020-07-28 RX ADMIN — Medication SCH MG: at 09:39

## 2020-07-28 RX ADMIN — ACETAMINOPHEN PRN MG: 325 TABLET, FILM COATED ORAL at 12:06

## 2020-07-28 RX ADMIN — CIPROFLOXACIN SCH MG: 500 TABLET, FILM COATED ORAL at 09:39

## 2020-07-28 RX ADMIN — CEFAZOLIN SCH MLS/HR: 330 INJECTION, POWDER, FOR SOLUTION INTRAMUSCULAR; INTRAVENOUS at 21:10

## 2020-07-28 RX ADMIN — POTASSIUM CHLORIDE SCH MEQ: 20 TABLET, EXTENDED RELEASE ORAL at 09:38

## 2020-07-28 RX ADMIN — CEFAZOLIN SCH MLS/HR: 330 INJECTION, POWDER, FOR SOLUTION INTRAMUSCULAR; INTRAVENOUS at 05:54

## 2020-07-28 NOTE — PN
PROGRESS NOTE



A 54-year-old white male. Denies any chest pain or shortness of breath or cough. No

nausea or vomiting.  No abdominal pain.  No diarrhea



Temperature 97.7, O2 of 97% on room air, blood pressure is 140s over 80s.

CARDIOVASCULAR: S1, S2.

LUNGS: Are clear.

GI: Soft.

HEMATOLOGY: Negative Homans.



Hemoglobin _____, white count 7.1, creatinine 0.61. Urine with Klebsiella.



Klebsiella UTI, covered with Rocephin and switch to oral Cipro.  Discharge home

tomorrow as he is greatly improved from alcohol withdrawal and more mentally with it at

this time.  Go home on Cipro tomorrow.





MMODL / IJN: [id number] / Job#: [id number]

## 2020-07-28 NOTE — P.DS
Providers


Date of admission: 


07/23/20 20:22





Expected date of discharge: 07/28/20


Attending physician: 


Uziel Hart





Consults: 





                                        





07/23/20 22:40


Consult Physician Routine 


   Consulting Provider: Jerson Cannon


   Consult Reason/Comments: urosepsis


   Do you want consulting provider notified?: Yes











Primary care physician: 


Stated None





Hospital Course: 


Final Diagnoses:


Sepsis secondary to acute UTI with Klebsiella pneumoniae, present on admission


Leukocytosis secondary to the above, resolved


Lactic acidosis, resolved


Hypovolemic hyponatremia, improving


Acute renal failure, prerenal, secondary to dehydration, present on admission, 

improved


Hypokalemia


Hypomagnesemia


History of alcohol abuse, reports decreased Alcohol use


Ongoing Nicotine dependence


History of peptic ulcer disease





Hospital Course:This is a 54-year-old gentleman admitted with multiple medical 

issues, including sepsis, acute UTI, dehydration, electrolyte imbalance.  

Maintained on gentle IV fluid hydration, IV antibiotics as per infectious 

disease.  Significant clinical improvement.  Evaluated by PT/OT with subacute 

rehab recommended.  Currently receiving supplementation for magnesium and 

potassium, recheck levels pending. Patient will be discharged today to subacute 

rehab.,  pending authorization, in a stable condition with guarded prognosis.








                                  Microbiology





07/23/20 17:43   Blood   Blood Culture - Preliminary


                            No Growth after 96 hours


07/23/20 19:24   Urine,Voided   Urine Culture - Final


                            Klebsiella pneumoniae














The impression and plan of care has been dictated as directed.





:


I performed a history and examination of this patient,  discussed the same with 

the dictator.  I agree with the dictator's note ,documented as a scribe.  Any 

additional findings or plans will be noted.


Patient Condition at Discharge: Stable





Plan - Discharge Summary


New Discharge Prescriptions: 


New


   Ciprofloxacin HCl [Cipro] 500 mg PO BID #14 tab


   carvediloL [Coreg] 6.25 mg PO BID-W/MEALS #60 tab


   amLODIPine [Norvasc] 5 mg PO DAILY #30 tab


   Thiamine [Vitamin B-1] 100 mg PO DAILY #30 tab


   Folic Acid 1 mg PO DAILY #30 tablet


   Multivitamins, Thera [Multivitamin (formulary)] 1 tab PO DAILY #30 tablet


   Acetaminophen Tab [Tylenol] 650 mg PO Q6HR PRN  tab


     PRN Reason: Fever And/ Or Pain


Discharge Medication List





Acetaminophen Tab [Tylenol] 650 mg PO Q6HR PRN  tab 07/28/20 [Rx]


Ciprofloxacin HCl [Cipro] 500 mg PO BID #14 tab 07/28/20 [Rx]


Folic Acid 1 mg PO DAILY #30 tablet 07/28/20 [Rx]


Multivitamins, Thera [Multivitamin (formulary)] 1 tab PO DAILY #30 tablet 

07/28/20 [Rx]


Thiamine [Vitamin B-1] 100 mg PO DAILY #30 tab 07/28/20 [Rx]


amLODIPine [Norvasc] 5 mg PO DAILY #30 tab 07/28/20 [Rx]


carvediloL [Coreg] 6.25 mg PO BID-W/MEALS #60 tab 07/28/20 [Rx]








Follow up Appointment(s)/Referral(s): 


Uziel Hart MD [STAFF PHYSICIAN] - 3 Days


Ambulatory/Diagnostic Orders: 


Complete Blood Count w/diff [LAB.AMB] Time Frame: 3 Days, Location: None 

Selected


Activity/Diet/Wound Care/Special Instructions: 


Pending magnesium and potassium supplementation and recheck levels within normal

 limits.

## 2020-07-28 NOTE — PN
PROGRESS NOTE



DATE OF SERVICE:

07/28/2020



REASON FOR FOLLOWUP:

Klebsiella urinary tract infection.



INTERVAL HISTORY:

Patient is currently afebrile.  The patient is breathing comfortably.  Denies having

any chest pain, no cough.  No nausea, no vomiting.  No abdominal pain, no diarrhea.



PHYSICAL EXAMINATION:

Blood pressure 149/91 with a pulse of 79, temperature is 97.7, he is 99% on room air.

General description is a middle-aged male, lying in bed in no distress.

RESPIRATORY SYSTEM: Unlabored breathing, clear to auscultation anteriorly.

HEART: S1, S2.  Regular rate and rhythm.

ABDOMEN:  Soft, no tenderness.



LABS:

Hemoglobin 11.4, white count 8.3, BUN of 6, creatinine 0.68.



DIAGNOSTIC IMPRESSION AND PLAN:

Patient with Klebsiella pneumoniae urinary tract infection.  Overall improvement.

Finish therapy with oral Cipro for another week and close outpatient followup.





MMODL / IJN: [id number] / Job#: [id number]

## 2020-07-29 VITALS — HEART RATE: 86 BPM | SYSTOLIC BLOOD PRESSURE: 153 MMHG | DIASTOLIC BLOOD PRESSURE: 90 MMHG | TEMPERATURE: 98.2 F

## 2020-07-29 VITALS — RESPIRATION RATE: 19 BRPM

## 2020-07-29 LAB
MAGNESIUM SPEC-SCNC: 1.3 MG/DL (ref 1.6–2.3)
POTASSIUM SERPL-SCNC: 3.6 MMOL/L (ref 3.5–5.1)

## 2020-07-29 RX ADMIN — Medication SCH MG: at 08:01

## 2020-07-29 RX ADMIN — CEFAZOLIN SCH MLS/HR: 330 INJECTION, POWDER, FOR SOLUTION INTRAMUSCULAR; INTRAVENOUS at 05:37

## 2020-07-29 RX ADMIN — CIPROFLOXACIN SCH MG: 500 TABLET, FILM COATED ORAL at 08:01

## 2020-07-29 NOTE — PN
PROGRESS NOTE



Continue Norvasc and Coreg for blood pressure. Thiamine for alcohol withdrawal. Cipro

for antibiotics.  Await for nursing home placement tomorrow.



CARDIOVASCULAR: S1, S2.

GI: Soft.

HEMATOLOGY: Negative Homans.

PSYCH: Fair mood and affect.



ASSESSMENT:

1. Urinary tract infection.

2. Metabolic encephalopathy due to _____ intoxication, _____ dependence.

Continue current treatment.  Possible discharge to nursing home tomorrow.





MMODL / IJN: [id number] / Job#: [id number]

## 2020-07-29 NOTE — PN
PROGRESS NOTE



DATE OF SERVICE:

07/29/2020



REASON FOR FOLLOWUP:

ESBL urinary tract infection.



INTERVAL HISTORY:

Patient is currently afebrile, patient is breathing comfortably.  Patient denies having

any chest pain.  No shortness of breath or cough.  No nausea, no vomiting.  No

abdominal pain, no diarrhea.



PHYSICAL EXAMINATION:

Blood pressure 150/90 with a pulse of 86, temperature 98.2, he is 99% on room air.

General description is a middle-aged male, lying in bed in no distress.

RESPIRATORY SYSTEM: Unlabored breathing, clear to auscultation anteriorly.

HEART:  S1, S2.  Regular rate and rhythm.

ABDOMEN:  Soft, no tenderness.



LABS:

No new labs today.  _____ 3.6, mag is 1.3.



DIAGNOSTIC IMPRESSION AND PLAN:

Patient with Klebsiella pneumoniae urinary tract infection in this patient who has

shown overall clinical improvement.  He is currently on Rocephin for about a week to

finish course of therapy.  Continue supportive care.





MMODL / IJN: [id number] / Job#: [id number]

## 2020-08-05 ENCOUNTER — HOSPITAL ENCOUNTER (OUTPATIENT)
Dept: HOSPITAL 47 - EC | Age: 55
Setting detail: OBSERVATION
LOS: 2 days | Discharge: NURSING, REHAB OR LONG-TERM CARE | End: 2020-08-07
Attending: FAMILY MEDICINE | Admitting: FAMILY MEDICINE
Payer: COMMERCIAL

## 2020-08-05 DIAGNOSIS — Z87.440: ICD-10-CM

## 2020-08-05 DIAGNOSIS — G93.49: ICD-10-CM

## 2020-08-05 DIAGNOSIS — E83.42: ICD-10-CM

## 2020-08-05 DIAGNOSIS — F10.21: ICD-10-CM

## 2020-08-05 DIAGNOSIS — R63.4: ICD-10-CM

## 2020-08-05 DIAGNOSIS — E87.6: ICD-10-CM

## 2020-08-05 DIAGNOSIS — F17.200: ICD-10-CM
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
## 2020-08-05 DIAGNOSIS — T84.021A: Primary | ICD-10-CM

## 2020-08-05 DIAGNOSIS — Z87.01: ICD-10-CM

## 2020-08-05 DIAGNOSIS — R41.3: ICD-10-CM

## 2020-08-05 DIAGNOSIS — Z20.828: ICD-10-CM

## 2020-08-05 DIAGNOSIS — Z79.899: ICD-10-CM

## 2020-08-05 DIAGNOSIS — Z87.11: ICD-10-CM

## 2020-08-05 DIAGNOSIS — N39.0: ICD-10-CM

## 2020-08-05 DIAGNOSIS — R41.82: ICD-10-CM

## 2020-08-05 DIAGNOSIS — R29.6: ICD-10-CM

## 2020-08-05 LAB
ALBUMIN SERPL-MCNC: 3 G/DL (ref 3.5–5)
ALP SERPL-CCNC: 87 U/L (ref 38–126)
ALT SERPL-CCNC: 28 U/L (ref 4–49)
ANION GAP SERPL CALC-SCNC: 6 MMOL/L
AST SERPL-CCNC: 43 U/L (ref 17–59)
BASOPHILS # BLD AUTO: 0 K/UL (ref 0–0.2)
BASOPHILS NFR BLD AUTO: 0 %
BUN SERPL-SCNC: 9 MG/DL (ref 9–20)
CALCIUM SPEC-MCNC: 8.8 MG/DL (ref 8.4–10.2)
CHLORIDE SERPL-SCNC: 102 MMOL/L (ref 98–107)
CO2 SERPL-SCNC: 25 MMOL/L (ref 22–30)
EOSINOPHIL # BLD AUTO: 0.1 K/UL (ref 0–0.7)
EOSINOPHIL NFR BLD AUTO: 1 %
ERYTHROCYTE [DISTWIDTH] IN BLOOD BY AUTOMATED COUNT: 3.12 M/UL (ref 4.3–5.9)
ERYTHROCYTE [DISTWIDTH] IN BLOOD: 13.7 % (ref 11.5–15.5)
GLUCOSE SERPL-MCNC: 100 MG/DL (ref 74–99)
HCT VFR BLD AUTO: 35.3 % (ref 39–53)
HGB BLD-MCNC: 11.5 GM/DL (ref 13–17.5)
HYALINE CASTS UR QL AUTO: 1 /LPF (ref 0–2)
INR PPP: 1 (ref ?–1.2)
LYMPHOCYTES # SPEC AUTO: 1.9 K/UL (ref 1–4.8)
LYMPHOCYTES NFR SPEC AUTO: 14 %
MCH RBC QN AUTO: 36.7 PG (ref 25–35)
MCHC RBC AUTO-ENTMCNC: 32.5 G/DL (ref 31–37)
MCV RBC AUTO: 113.1 FL (ref 80–100)
MONOCYTES # BLD AUTO: 0.8 K/UL (ref 0–1)
MONOCYTES NFR BLD AUTO: 6 %
NEUTROPHILS # BLD AUTO: 10.6 K/UL (ref 1.3–7.7)
NEUTROPHILS NFR BLD AUTO: 78 %
PH UR: 6.5 [PH] (ref 5–8)
PLATELET # BLD AUTO: 256 K/UL (ref 150–450)
POTASSIUM SERPL-SCNC: 4 MMOL/L (ref 3.5–5.1)
PROT SERPL-MCNC: 6.7 G/DL (ref 6.3–8.2)
PROT UR QL: (no result)
PT BLD: 10.2 SEC (ref 9–12)
RBC UR QL: 1 /HPF (ref 0–5)
SODIUM SERPL-SCNC: 133 MMOL/L (ref 137–145)
SP GR UR: 1.02 (ref 1–1.03)
SQUAMOUS UR QL AUTO: <1 /HPF (ref 0–4)
UROBILINOGEN UR QL STRIP: <2 MG/DL (ref ?–2)
WBC # BLD AUTO: 13.7 K/UL (ref 3.8–10.6)
WBC #/AREA URNS HPF: 1 /HPF (ref 0–5)

## 2020-08-05 PROCEDURE — 81001 URINALYSIS AUTO W/SCOPE: CPT

## 2020-08-05 PROCEDURE — 99153 MOD SED SAME PHYS/QHP EA: CPT

## 2020-08-05 PROCEDURE — 27265 TREAT HIP DISLOCATION: CPT

## 2020-08-05 PROCEDURE — 97162 PT EVAL MOD COMPLEX 30 MIN: CPT

## 2020-08-05 PROCEDURE — 80053 COMPREHEN METABOLIC PANEL: CPT

## 2020-08-05 PROCEDURE — 97535 SELF CARE MNGMENT TRAINING: CPT

## 2020-08-05 PROCEDURE — 99152 MOD SED SAME PHYS/QHP 5/>YRS: CPT

## 2020-08-05 PROCEDURE — 99285 EMERGENCY DEPT VISIT HI MDM: CPT

## 2020-08-05 PROCEDURE — 99284 EMERGENCY DEPT VISIT MOD MDM: CPT

## 2020-08-05 PROCEDURE — 96374 THER/PROPH/DIAG INJ IV PUSH: CPT

## 2020-08-05 PROCEDURE — 82140 ASSAY OF AMMONIA: CPT

## 2020-08-05 PROCEDURE — 97166 OT EVAL MOD COMPLEX 45 MIN: CPT

## 2020-08-05 PROCEDURE — 73502 X-RAY EXAM HIP UNI 2-3 VIEWS: CPT

## 2020-08-05 PROCEDURE — 97530 THERAPEUTIC ACTIVITIES: CPT

## 2020-08-05 PROCEDURE — 73501 X-RAY EXAM HIP UNI 1 VIEW: CPT

## 2020-08-05 PROCEDURE — 70551 MRI BRAIN STEM W/O DYE: CPT

## 2020-08-05 PROCEDURE — 82607 VITAMIN B-12: CPT

## 2020-08-05 PROCEDURE — 70450 CT HEAD/BRAIN W/O DYE: CPT

## 2020-08-05 PROCEDURE — 85025 COMPLETE CBC W/AUTO DIFF WBC: CPT

## 2020-08-05 PROCEDURE — 85610 PROTHROMBIN TIME: CPT

## 2020-08-05 RX ADMIN — CEFAZOLIN SCH MLS/HR: 330 INJECTION, POWDER, FOR SOLUTION INTRAMUSCULAR; INTRAVENOUS at 05:28

## 2020-08-05 RX ADMIN — HYDROMORPHONE HYDROCHLORIDE PRN MG: 1 INJECTION, SOLUTION INTRAMUSCULAR; INTRAVENOUS; SUBCUTANEOUS at 13:51

## 2020-08-05 RX ADMIN — HYDROMORPHONE HYDROCHLORIDE PRN MG: 1 INJECTION, SOLUTION INTRAMUSCULAR; INTRAVENOUS; SUBCUTANEOUS at 19:10

## 2020-08-05 RX ADMIN — THERA TABS SCH: TAB at 06:56

## 2020-08-05 RX ADMIN — FOLIC ACID SCH: 1 TABLET ORAL at 06:56

## 2020-08-05 RX ADMIN — Medication SCH: at 06:56

## 2020-08-05 RX ADMIN — CEFAZOLIN SCH: 330 INJECTION, POWDER, FOR SOLUTION INTRAMUSCULAR; INTRAVENOUS at 17:05

## 2020-08-05 RX ADMIN — CEFAZOLIN SCH: 330 INJECTION, POWDER, FOR SOLUTION INTRAMUSCULAR; INTRAVENOUS at 11:12

## 2020-08-05 NOTE — ED
General Adult HPI





- General


Chief complaint: Fall


Stated complaint: L hip injury


Time Seen by Provider: 08/05/20 01:06


Source: patient, EMS


Mode of arrival: EMS


Limitations: altered mental status





- History of Present Illness


Initial comments: 





This patient is a 54-year-old man in [facility] to be evaluated for left hip

pain.  The nursing home reports that the patient did have a fall.  They 

reportedly had x-ray of the left hip that revealed dislocation of the left 

prosthetic hip joint.  The patient states that he believes his surgeon was Dr. Eugene Campbell.  He states that this hip has had previous dislocations.  It had 

to be reduced in the operating room.  The patient states that he has been having

intermittent left hip pains and falling going back for some time now but not 

able to state exactly how long this is been.  Review the records reveals that 

the patient had been admitted here and discharged on July 29 after episode of 

sepsis felt to be related to urinary tract infection.  Patient denies any other 

injury related to fall.


-: unknown


Location: left, lower extremity


Radiation: non-radiation


Quality: aching


Consistency: intermittent (Mainly when the patient attempts to move)


Improves with: immobilization


Worsens with: movement


Associated Symptoms: denies other symptoms


Treatments Prior to Arrival: none





- Related Data


                                  Previous Rx's











 Medication  Instructions  Recorded


 


Acetaminophen Tab [Tylenol] 650 mg PO Q6HR PRN  tab 07/28/20


 


Folic Acid 1 mg PO DAILY #30 tablet 07/28/20


 


Multivitamins, Thera [Multivitamin 1 tab PO DAILY #30 tablet 07/28/20





(formulary)]  


 


Thiamine [Vitamin B-1] 100 mg PO DAILY #30 tab 07/28/20


 


amLODIPine [Norvasc] 5 mg PO DAILY #30 tab 07/28/20


 


carvediloL [Coreg] 6.25 mg PO BID-W/MEALS #60 tab 07/28/20











                                    Allergies











Allergy/AdvReac Type Severity Reaction Status Date / Time


 


No Known Allergies Allergy   Verified 08/05/20 07:49














Review of Systems


ROS Statement: 


Those systems with pertinent positive or pertinent negative responses have been 

documented in the HPI.





ROS Other: All systems not noted in ROS Statement are negative.


Constitutional: Denies: fever


Respiratory: Denies: cough, dyspnea


Cardiovascular: Denies: chest pain, edema, syncope


Gastrointestinal: Denies: abdominal pain, vomiting, diarrhea


Genitourinary: Denies: dysuria, hematuria


Musculoskeletal: Reports: as per HPI, arthralgia (Left hip pain)


Skin: Denies: rash


Neurological: Denies: headache, weakness, numbness, paresthesias





Past Medical History


Past Medical History: Pneumonia


Additional Past Medical History / Comment(s): gastric ulcer, ETOH-liquor, beer, 

daily


History of Any Multi-Drug Resistant Organisms: None Reported


Past Surgical History: Joint Replacement, Orthopedic Surgery


Additional Past Surgical History / Comment(s): total hip (unsure of which one), 

closed reductions, recent weight loss


Past Psychological History: No Psychological Hx Reported


Smoking Status: Current every day smoker


Past Alcohol Use History: Occasional


Past Drug Use History: Marijuana





General Exam


Limitations: altered mental status


General appearance: alert, in no apparent distress


Head exam: Present: atraumatic, normocephalic


Eye exam: Present: normal appearance


ENT exam: Present: mucous membranes dry


Neck exam: Present: full ROM.  Absent: tenderness


Respiratory exam: Present: normal lung sounds bilaterally.  Absent: respiratory 

distress, wheezes, rales, rhonchi, stridor


Cardiovascular Exam: Present: regular rate, normal rhythm, normal heart sounds. 

 Absent: systolic murmur, diastolic murmur, rubs, gallop


GI/Abdominal exam: Present: soft.  Absent: distended, tenderness, guarding, 

rebound, rigid, mass


Extremities exam: Present: normal capillary refill, other (There is shortening 

of the left lower extremity.  Distal pulses are intact and there is good 

capillary refill.  No sensory deficit.  Patient able to move toes.).  Absent: 

pedal edema


Back exam: Absent: vertebral tenderness


Neurological exam: Present: alert.  Absent: motor sensory deficit


Skin exam: Present: warm, dry, intact, normal color.  Absent: rash





Course


                                   Vital Signs











  08/05/20 08/05/20 08/05/20





  01:01 03:05 03:10


 


Temperature 98.3 F  


 


Pulse Rate 90 84 81


 


Pulse Rate [   





Pulse Oximetery   





]   


 


Respiratory 16 18 18





Rate   


 


Blood Pressure 102/81 144/101 112/43


 


Blood Pressure   





[Right Arm]   


 


O2 Sat by Pulse 99 97 100





Oximetry   














  08/05/20 08/05/20 08/05/20





  03:15 03:20 03:25


 


Temperature   


 


Pulse Rate 85 76 86


 


Pulse Rate [   





Pulse Oximetery   





]   


 


Respiratory 18 18 18





Rate   


 


Blood Pressure 113/86 95/83 109/86


 


Blood Pressure   





[Right Arm]   


 


O2 Sat by Pulse 100 100 100





Oximetry   














  08/05/20 08/05/20 08/05/20





  03:30 03:45 04:00


 


Temperature   


 


Pulse Rate 87 83 84


 


Pulse Rate [   





Pulse Oximetery   





]   


 


Respiratory 18 18 16





Rate   


 


Blood Pressure 108/57 114/83 155/95


 


Blood Pressure   





[Right Arm]   


 


O2 Sat by Pulse 100 100 100





Oximetry   














  08/05/20 08/05/20 08/05/20





  04:15 04:30 05:02


 


Temperature   98.1 F


 


Pulse Rate 81 84 


 


Pulse Rate [   83





Pulse Oximetery   





]   


 


Respiratory 20 18 16





Rate   


 


Blood Pressure 137/86 124/76 


 


Blood Pressure   158/98





[Right Arm]   


 


O2 Sat by Pulse 98 100 100





Oximetry   














Procedures





- Universal Protocol (Time Out)


Procedure Performed:: Reduction of left hip


Performing Provider: Joao Lyons


Nurse: Claudia Adkins


Respiratory Therapist: Tahira Bertrand


Patient Identification (2 identifiers required): Verbal, Arm Band, Name, 

Birthdate, Medical Record Number


Patient/Legal Representative has Confirmed: Identity, Site, Procedure, Consent


Site: left hip


Site Marked: No


Site Verified With Patient/Guardian: Yes


Final Confirmation: Procedure, Site





- Orthopedic Joint Reduction


  ** Joint #1


Consent Obtained: written consent


Side: left


Joint Reduction Location: hip


Analgesia: procedural sedation


Technique Used: traction/counter-traction


Post-Reduction Neuro Exam: intact


Post-Reduction Vascular Exam: intact


Post Reduction X-Ray Obtained: Yes


Post Reduction X-Ray Results: reduced


Patient Tolerated Procedure: well





- Procedural Sedation


Procedural Sedation Start Time: 03:05


Procedural Sedation Stop Time: 03:30


Indications: fracture/dislocation reduction


ASA Class: II


Mallampati Airway Score: 2


Preparation: cardiac monitor applied, pulse oximeter, capnometry used, 

supplemental O2 applied, suction/airway equipment at bedside, IV secured


IV Propofol Dose (mgs): 145


Complications: none


Patient Tolerated Procedure: well, no complications





Medical Decision Making





- Medical Decision Making





Patient's 54-year-old man from nursing home with left hip dislocation.  We then 

discussed risks and benefits of sedation and closed reduction.  Patient did prov

kirstie written consent.  Time out performed.  The patient did have procedural 

sedation.  Closed reduction performed by myself and conjunction with the 

physician assistant.  They did seem to be successful reduction but as traction 

was released the it came out of joint.  Patient was given additional 50 mg of 

propofol and the hip again reduced this time x-ray was taken with traction and 

this did seem to show reduction but as the traction was relax the hip moved 

proximal and was out of position.  The patient did not have complication.  

Discussed with patient and he would like to stay here to be seen by orthopedics.

 Case discussed with Dr. Parra, who is on-call and will see the patient.





- Lab Data


Result diagrams: 


                                 08/05/20 04:19





                                 08/05/20 04:19





Disposition


Clinical Impression: 


 Hip dislocation, left





Disposition: ADMITTED AS IP TO THIS HOSP


Condition: Fair


Is patient prescribed a controlled substance at d/c from ED?: No

## 2020-08-05 NOTE — P.OP
Date of Procedure: 08/05/20


Preoperative Diagnosis: 


Left total hip arthroplastyposterior dislocation


Postoperative Diagnosis: 


Same


Procedure(s) Performed: 


Closed reduction left hip dislocation with IV sedation/fluoroscopy


Anesthesia: MAC


Surgeon: Chintan Parra


Estimated Blood Loss (ml): 0


Pathology: none sent


Condition: stable


Disposition: PACU


Indications for Procedure: 


The patient's a 54-year-old nursing home resident who presents after a fall 

yesterday with a left posterior hip dislocation.  Attempted closed reduction was

performed in the emergency room by the ER physician and that was unsuccessful.  

Risks and benefits of repeat attempted closed reduction with IV sedation and 

fluoroscopy was made with the patient.  He opted to proceed.  He understood the 

risks to include persistent instability and possible need for subsequent 

procedures.  Informed consent was obtained.


Operative Findings: 


As below


Description of Procedure: 


The patient was brought to the operating room, and after induction of IV 

sedation the hip was then reduced with longitudinal traction with the hip flexed

at 90.  I was able to obtain reduction.  This was verified with fluoroscopy.  

He was felt to be stable up to 90 of flexion and in full extension.  He was 

then transferred to the recovery room without complication.  There was no blood 

loss.  No complications were incurred.

## 2020-08-05 NOTE — XR
EXAM:

  XR Left Hip With Pelvis When Performed, 2 or 3 Views

 

CLINICAL HISTORY:

  ITS.REASON XR Reason: reduction

 

TECHNIQUE:

  Two or three views of the left hip with pelvis when performed.

 

COMPARISON:

  Multiple same day left hip radiographs.

 

FINDINGS:

  Bones/joints:  Persistent dislocation of the left hip prosthesis.  No 

displaced fracture.

  Soft tissues:  Unremarkable.

 

IMPRESSION:     

  Persistent dislocation of the left hip prosthesis.

## 2020-08-05 NOTE — XR
EXAM:

  XR Left Hip With Pelvis When Performed, 2 or 3 Views

 

CLINICAL HISTORY:

  ITS.REASON XR Reason: post reduction

 

TECHNIQUE:

  Two or three views of the left hip with pelvis when performed.

 

COMPARISON:

  Multiple same-day left hip radiographs

 

FINDINGS:

  Bones/joints:  Improved alignment of the left hip prosthesis with 

traction, but the femoral prosthesis does not sit normally within the cup 

of the acetabular prosthesis.  Dislocation of the left hip prosthesis 

without traction.  No displaced fracture.

  Soft tissues:  Unremarkable.

 

IMPRESSION:     

1.  Improved alignment of the left hip prosthesis with traction, but the 

femoral prosthesis still does not sit normally within the cup of the 

acetabular prosthesis.

2.  Dislocation of the left hip prosthesis without traction.

## 2020-08-05 NOTE — XR
EXAM:

  XR Pelvis, 1 or 2 Views

 

CLINICAL HISTORY:

  ITS.REASON XR Reason: fall

 

TECHNIQUE:

  Frontal view of the pelvis.

 

COMPARISON:

  None

 

FINDINGS:

  Bones/joints:  Posterior dislocation of the left hip.  Left hip 

prosthesis.  No displaced fracture.

  Soft tissues:  Unremarkable.

 

IMPRESSION:     

  Posterior dislocation of the left hip.

## 2020-08-05 NOTE — CONS
CONSULTATION



54-year-old white male consultation status post left hip arthroplasty.  The patient was

sent here from the rehab center after recently discharged for alcohol withdrawal,

urinary tract infection.  He has his left hip alcohol when he twisted and possible

dislocation.  He is status post total hip arthroplasty and reduction today.



PAST MEDICAL HISTORY:

Alcoholism, UTI, history joint replacement, orthopedic surgery, recent weight loss,

close reduction, gastric ulcer, alcohol. Liquor, beer daily.



SOCIAL HISTORY:

Current everyday smoker, heavy alcohol, marijuana drug use.



HOME MEDICATIONS:

Tylenol 650 q.6h p.r.n., Cipro 500 p.o. b.i.d., folic acid 1 mg daily, multivitamin

daily, Norvasc 5 mg daily, Coreg 6.25 b.i.d.



PHYSICAL EXAMINATION:

Vital signs reviewed.  Cardiovascular S1, S2.  Lungs clear.  GI soft.  Hematology

negative Homans.



White count 13.7, hemoglobin 11.5.



ASSESSMENT:

1. Status post hip dislocation with replacement with left total hip arthroplasty

    dislocation.

2. History of alcohol abuse.

3. urinary tract infection.



PLAN:

Continue with home medications.  The patient should have a good postop course.  Get

back to the rehab center.





MMODL / IJN: [id number] / Job#: [id number]

## 2020-08-05 NOTE — P.HPOR
History of Present Illness


H&P Date: 08/05/20


Chief Complaint: Left hip pain





The patient's a 54-year-old male with history of alcoholism who presents after a

questionable injury at a rehab facility yesterday.  He said he normally 

ambulates without assistive devices.  He thinks his left hip hao when he 

twisted.  He is a poor historian.  He can't recall whether he said previous 

dislocation episodes.  He can't recall which year he had his total hip 

arthroplasty in.  Attempted reduction was performed by the ER physician.





Review of Systems





As per HPI





Past Medical History


Past Medical History: Pneumonia


Additional Past Medical History / Comment(s): gastric ulcer, ETOH-liquor, beer, 

daily


History of Any Multi-Drug Resistant Organisms: None Reported


Past Surgical History: Joint Replacement (Left total hip arthroplasty), 

Orthopedic Surgery


Additional Past Surgical History / Comment(s): total hip (unsure of which one), 

knee surgery on both, closed reductions, recent weight loss


Past Anesthesia/Blood Transfusion Reactions: No Reported Reaction


Past Psychological History: No Psychological Hx Reported


Smoking Status: Current every day smoker


Past Alcohol Use History: Heavy, Occasional


Past Drug Use History: Marijuana





Medications and Allergies


                                Home Medications











 Medication  Instructions  Recorded  Confirmed  Type


 


Acetaminophen Tab [Tylenol] 650 mg PO Q6HR PRN  tab 07/28/20  Rx


 


Ciprofloxacin HCl [Cipro] 500 mg PO BID #14 tab 07/28/20  Rx


 


Folic Acid 1 mg PO DAILY #30 tablet 07/28/20  Rx


 


Multivitamins, Thera [Multivitamin 1 tab PO DAILY #30 tablet 07/28/20  Rx





(formulary)]    


 


Thiamine [Vitamin B-1] 100 mg PO DAILY #30 tab 07/28/20  Rx


 


amLODIPine [Norvasc] 5 mg PO DAILY #30 tab 07/28/20  Rx


 


carvediloL [Coreg] 6.25 mg PO BID-W/MEALS #60 tab 07/28/20  Rx








                                    Allergies











Allergy/AdvReac Type Severity Reaction Status Date / Time


 


No Known Allergies Allergy   Verified 07/23/20 17:39














Physical Examination





- Hip


  ** left


Gait: other (Nonweightbearing)


Tenderness with palpation: other (Diffuse)


Pain with motion: other (Pain with any attempted range of motion)





Results





Moderate shortening left lower extremity


Healed posterior lateral incision left hip


Pain with any attempted range of motion left hip


Painless range of motion right hip


Distal neurovascular exam intact left lower extremity





- Labs


Labs: 


                  Abnormal Lab Results - Last 24 Hours (Table)











  08/05/20 08/05/20 08/05/20 Range/Units





  04:19 04:19 04:19 


 


WBC  13.7 H    (3.8-10.6)  k/uL


 


RBC  3.12 L    (4.30-5.90)  m/uL


 


Hgb  11.5 L    (13.0-17.5)  gm/dL


 


Hct  35.3 L    (39.0-53.0)  %


 


MCV  113.1 H    (80.0-100.0)  fL


 


MCH  36.7 H    (25.0-35.0)  pg


 


Neutrophils #  10.6 H    (1.3-7.7)  k/uL


 


Macrocytosis  Marked A    


 


Sodium   133 L   (137-145)  mmol/L


 


Glucose   100 H   (74-99)  mg/dL


 


Albumin   3.0 L   (3.5-5.0)  g/dL


 


Urine Protein    1+ H  (Negative)  


 


Urine Bacteria    Rare H  (None)  /hpf


 


Urine Mucus    Rare H  (None)  /hpf








                                      H & H











  08/05/20 Range/Units





  04:19 


 


Hgb  11.5 L  (13.0-17.5)  gm/dL


 


Hct  35.3 L  (39.0-53.0)  %








                                   Coagulation











  08/05/20 Range/Units





  05:30 


 


INR  1.0  (<1.2)  











Result Diagrams: 


                                 08/05/20 04:19





                                 08/05/20 04:19





- Diagnostic results


Hip x-ray: image reviewed (Posterior dislocation left total hip arthroplasty)





Assessment and Plan


Assessment: 





Left total hip arthroplasty dislocation


History of alcohol abuse


Plan: 





I talked with the patient regarding his condition along with options.  We'll 

attempt closed reduction in the operating room with the aid of fluoroscopy and 

anesthesia.


Time with Patient: Greater than 30

## 2020-08-05 NOTE — XR
Fluoroscopy

 

INDICATION: Pain

 

FINDINGS:

 

Fluoroscopy time: Not recorded seconds.

 

Images obtained: 1.

 

IMPRESSIONS:

1. Documentation of fluoroscopy.

## 2020-08-05 NOTE — FL
Fluoroscopy

 

INDICATION: Pain

 

FINDINGS:

Images obtained: 1.

 

IMPRESSIONS:

1. Documentation of fluoroscopy.

## 2020-08-05 NOTE — XR
EXAM:

  XR Left Hip With Pelvis When Performed, 2 or 3 Views

 

CLINICAL HISTORY:

  ITS.REASON XR Reason: Post reduction

 

TECHNIQUE:

  Two or three views of the left hip with pelvis when performed.

 

COMPARISON:

  Left hip radiographs on 8/5/2020 at 153 hours

 

FINDINGS:

  Bones/joints:  Persistent dislocation of the left hip prosthesis.  No 

displaced fracture.

  Soft tissues:  Unremarkable.

 

IMPRESSION:     

  Persistent dislocation of the left hip prosthesis.

## 2020-08-06 RX ADMIN — CEFAZOLIN SCH: 330 INJECTION, POWDER, FOR SOLUTION INTRAMUSCULAR; INTRAVENOUS at 00:10

## 2020-08-06 RX ADMIN — CEFAZOLIN SCH: 330 INJECTION, POWDER, FOR SOLUTION INTRAMUSCULAR; INTRAVENOUS at 20:02

## 2020-08-06 RX ADMIN — HYDROMORPHONE HYDROCHLORIDE PRN MG: 1 INJECTION, SOLUTION INTRAMUSCULAR; INTRAVENOUS; SUBCUTANEOUS at 07:14

## 2020-08-06 RX ADMIN — Medication SCH MG: at 07:13

## 2020-08-06 RX ADMIN — HYDROMORPHONE HYDROCHLORIDE PRN MG: 1 INJECTION, SOLUTION INTRAMUSCULAR; INTRAVENOUS; SUBCUTANEOUS at 01:09

## 2020-08-06 RX ADMIN — CEFAZOLIN SCH: 330 INJECTION, POWDER, FOR SOLUTION INTRAMUSCULAR; INTRAVENOUS at 07:00

## 2020-08-06 RX ADMIN — FOLIC ACID SCH MG: 1 TABLET ORAL at 07:13

## 2020-08-06 RX ADMIN — CEFAZOLIN SCH: 330 INJECTION, POWDER, FOR SOLUTION INTRAMUSCULAR; INTRAVENOUS at 14:18

## 2020-08-06 RX ADMIN — THERA TABS SCH EACH: TAB at 07:13

## 2020-08-06 RX ADMIN — HYDROMORPHONE HYDROCHLORIDE PRN MG: 1 INJECTION, SOLUTION INTRAMUSCULAR; INTRAVENOUS; SUBCUTANEOUS at 13:57

## 2020-08-06 NOTE — CONS
CONSULTATION



DATE OF CONSULTATION:

08/06/2020



REASON FOR CONSULTATION:

Altered mental status and short-term memory problem.



IDENTIFYING DATA:

The patient is a 54-year-old   male who was just recently 
discharged

from the hospital on July 29 to [facility].  The patient was brought back 
to the

hospital from the nursing home on August 5, as he did have a fall. The patient 
did

undergo surgery on August 5, 2020, closed reduction of the left hip dislocation 
with IV

sedation.



HISTORY OF PRESENT ILLNESS:

I did approach the patient, who stated, "I was so confused yesterday and also 
this

morning.  I did not know exactly if I am in [facility]." The patient 
denied

having any memory problem or short-term deficit.  He was very pleasant and 
cooperative.

He denied any depression symptom or psychotic symptom.  He stated that he has 
been

drinking almost on a daily basis since age 12.  It is either a couple of beers 
and in addition he

does drink half a pint of hard liquor once a week.  He denied having any 
withdrawal

symptoms from alcohol.  However, on his previous admission from July 24 to July 29, he

was treated for alcohol withdrawal with Ativan.



PAST PSYCHIATRIC HISTORY:

There is no previous inpatient or outpatient treatment.



ALLERGIES:

NO KNOWN ALLERGY.



PAST MEDICAL HISTORY:

Left reduction of dislocated hip, history of urinary tract infection and history
of old

stroke.



SUBSTANCE USE HISTORY:

As I mentioned before, he has been drinking alcohol almost on a daily basis 
since age

12; according to him, it is a couple of beers, but he said, "Sometimes it is up 
to 6 or

7. It depends on the day."



He said he does smoke marijuana occasionally.



He denied any rehabilitation program.



SOCIAL HISTORY:

The patient was born and raised in [place].  He was  for 20 years and 
his

marriage ended by divorce.  He has 2 sons, age 36 and 32.  His younger son is 
living

with him.  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.  He

said that he has been in the nursing home from July 29 until August 5, when he 
came

back to the hospital.  Prior to this he was living in [place].



MENTAL STATUS EXAMINATION:

The patient presented as a pleasant, cooperative  male who was wearing

eyeglasses. He had a full zapata and mustache. He is alert and oriented to 
person.

Regarding place, he stated at first it was [facility], then he corrected himself;
he

said, "No, it is hospital, but I do not remember the name of this hospital." He 
was

oriented to the month and the year, but for today's date he told me it is August
9

instead of August 6.  He was able to tell me the season, the state, the county. 
He

could not tell me which floor he is on or which room he is in.  He recalled one 
out of

three objects after one minute and zero out of three objects after 5 minutes.  
He did

simple calculations and he was able to spell his last name backwards.  Speech is

coherent with normal rate, rhythm and volume.  He has no articulation 
difficulty.  He

denied any suicidal ideation and he denied any homicidal ideation.  He denied 
feeling

hopeless or helpless.  He did not express any paranoia or ideas of reference or

delusional thinking.  His intellectual function is average.  Insight and 
judgment are

fair.



ASSESSMENT:

This patient has a long history of alcohol use disorder; however, he said that 
he did

not drink since he was admitted on July 24, but he did have a urinary tract 
infection

on previous admission and he was treated by antibiotic.  My impression is that 
it is

post-operative delirium reaction, and it has resolved.  His score on the mini 
mental

status exam is 22/30.  Usually we DX Neurocognitive disorder if score is 21 or 
less.  However, I

did explain to him that if he continues to drink after his rehabilitation, he 
would

have most probably neurocognitive deficits from alcohol.



Psychiatrist will sign off.  Thank you for the consultation.





KEVIN / CHRISTINE: [id number] / Job#: [id number]

WISAM

## 2020-08-06 NOTE — P.CNNES
History of Present Illness


Consult date: 08/06/20


Requesting physician: Uziel Hart


Reason for Consult: short term memory loss


History of Present Illness: 





This is a 54-year-old Right-handed gentleman with medical history of Significant

alcohol use, Right hip surgery, right knee surgery,urinary tract infection and 

tobacco use  and per medical records he has history of alcohol withdrawal who  

presented to the emergency department on 08/05/2024 left hip pain.  He comes 

from [facility].  Patient stated he got up he was having left hip 

pain that was significant then leg gave out and fell.  Patient had an x-ray of 

both hips and revealed dislocation of the left prosthetic hip joint.  Patient 

came in with left hip pain radiating to his back.    On 08/05/2020 he had a 

closed reduction of left hip dislocation with IV sedation and fluoroscopy.  The 

neurology team was consulted for a patient altered mental status/short-term 

memory loss.  Upon seeing the patient.  Patient was wide awake responding to all

questions correctly and was able to give me a history.  Patient's nurse also 

stated that the patient was alert oriented 3.  Maybe overnight the nurse felt 

like she was alert oriented 1.  Patient denies of having any memory memory 

problems.  She denies denies of any focal weakness any numbness any slurred 

speech any difficulty swallowing any visual disturbance.  Denies of any 

headache.  Patient states that he does drink about 2-5 cans of beer daily for 

years.  He does smoke about three quarters of a pack a day and sometimes more 

and he's been doing it for years.  He does have significant right hip surgery 

knee surgery and and that's why he is at the nursing facility.  He's been 

therefore couple days according to him.





Patient's white blood cell last was is 13.7 sodium is 133.  AST is 43 ALTs 28.  

Urinalysis analysis last done on 08/05/20 was unremarkable for any infection.  

During his stay in the hospital he has not had any fever.


There is a vitamin B12 what is 623.  TSH was done on 07/23/20 and was normal 

(3.0 and the). 


I ordered a CT of the head:  And was reported as subcenterimer hypodensity in 

the bilateral thalami which can be chronic and also old right frontal.  No acute

ischemia.  Was recommended to get MRI of the brain as a follow-up.  I personally

reviewed the images and I did not see any acute ischemia and hemorrhage myself. 





Patient initial blood pressure is 98.3 Fahrenheit oral and blood pressure is 

102/81 oxygen saturation is 99 at room air pulse rate is 90 and respiratory is a


Also the patient was discharged in 07/29/2020 after an episode of sepsis thought

related to urinary tract infection. 





Patient is on thiamine 100 mg daily.





I attempted to contact patient next of kin Dianna at [phone] but no re

sponse.




















Review of Systems


Review of system:  The 12 point system was reviewed and apparent positive and 

negative per HPI.








Past Medical History


Past Medical History: Pneumonia


Additional Past Medical History / Comment(s): gastric ulcer, ETOH-liquor, beer, 

daily


History of Any Multi-Drug Resistant Organisms: None Reported


Past Surgical History: Joint Replacement, Orthopedic Surgery


Additional Past Surgical History / Comment(s): total hip (unsure of which one), 

closed reductions, recent weight loss


Past Anesthesia/Blood Transfusion Reactions: No Reported Reaction


Past Psychological History: No Psychological Hx Reported


Smoking Status: Current every day smoker


Past Alcohol Use History: Occasional


Past Drug Use History: Marijuana





Medications and Allergies


                                Home Medications











 Medication  Instructions  Recorded  Confirmed  Type


 


Acetaminophen Tab [Tylenol] 650 mg PO Q6HR PRN  tab 07/28/20 08/05/20 Rx


 


Folic Acid 1 mg PO DAILY #30 tablet 07/28/20 08/05/20 Rx


 


Multivitamins, Thera [Multivitamin 1 tab PO DAILY #30 tablet 07/28/20 08/05/20 

Rx





(formulary)]    


 


Thiamine [Vitamin B-1] 100 mg PO DAILY #30 tab 07/28/20 08/05/20 Rx


 


amLODIPine [Norvasc] 5 mg PO DAILY #30 tab 07/28/20 08/05/20 Rx


 


carvediloL [Coreg] 6.25 mg PO BID-W/MEALS #60 tab 07/28/20 08/05/20 Rx








                                    Allergies











Allergy/AdvReac Type Severity Reaction Status Date / Time


 


No Known Allergies Allergy   Verified 08/05/20 07:49














Physical Examination





- Vital Signs


Vital Signs: 


                                   Vital Signs











  Temp Pulse Resp BP BP Pulse Ox


 


 08/06/20 01:03  97.9 F  89  16  155/93   99


 


 08/05/20 18:37  98.0 F  88  16  102/67   100


 


 08/05/20 15:14   85   116/74  


 


 08/05/20 14:59   87   118/77  


 


 08/05/20 14:31   88   118/79  


 


 08/05/20 14:14   97   154/95  


 


 08/05/20 13:59   82   128/81  


 


 08/05/20 13:29   76   150/91  


 


 08/05/20 13:28  97.5 F L  78  18  133/78   98


 


 08/05/20 13:19   76  18   124/67  99


 


 08/05/20 13:05   79  18   129/75  99


 


 08/05/20 12:50   85  18   127/70  97


 


 08/05/20 11:24  98.1 F  83  18   147/86  97








                                Intake and Output











 08/05/20 08/06/20 08/06/20





 22:59 06:59 14:59


 


Intake Total  1000 


 


Output Total 400 800 


 


Balance -400 200 


 


Intake:   


 


  Oral  1000 


 


Output:   


 


  Urine 400 800 


 


Other:   


 


  Voiding Method Urinal Urinal 


 


  # Voids  3 











GENERAL: The patient is lying in bed and is not in acute distress.


CHEST: The heart rate is regular rate rhythm.   No murmurs to auscultation. 


LUNG: Clear to auscultation bilaterally no wheezing noted throughout.  Not la

bored breathing.


ABDOMEN/GI: Bowel sounds present in all 4 quadrants. No tenderness to palpation 

throughout.





NEUROLOGICAL:


Higher mental function: The patient is awake, alert, oriented to self, place and

 time.  Able to identify objects such as pen, glassess and watch.   Patient is 

following simple and complex commands.  No aphasia and no neglect.


Cranial nerves: The pupils are round, equal and reactive to light and 

accommodation.  Visual fields are full to confrontation throughout.  Extraocular

 movement is intact no nystagmus is noted.  Facial sensation is normal to touch 

throughout.  The facial strength is normal throughout.  Hearing is normal 

bilaterally to hand rub.  Tongue is midline and moved side-to-side without any 

difficulty.  No dysarthria is noted.  Shoulder shrug is normal bilaterally.


Motor: Gait is defered because of condition.  The strength is 5 over 5 

throughout bilateral upper extremities.  Not able to assess lower extremties 

because of current surgery.  He has intact good strength of bilateral feet 5/5. 

 Normal tone and bulk.  


Cerebellum: Normal finger to nose bilaterally.


Sensation: Sensation is normal to touch throughout.


Reflexes (right/left):2+ in bilaterally upper extremities and not able to assess

 lower extremities.


Plantars are downgoing bilaterally. 








Results





- Laboratory Findings


CBC and BMP: 


                                 08/05/20 04:19





                                 08/05/20 04:19


Abnormal Lab Findings: 


                                  Abnormal Labs











  08/05/20 08/05/20 08/05/20





  04:19 04:19 04:19


 


WBC  13.7 H  


 


RBC  3.12 L  


 


Hgb  11.5 L  


 


Hct  35.3 L  


 


MCV  113.1 H  


 


MCH  36.7 H  


 


Neutrophils #  10.6 H  


 


Macrocytosis  Marked A  


 


Sodium   133 L 


 


Glucose   100 H 


 


Albumin   3.0 L 


 


Urine Protein    1+ H


 


Urine Bacteria    Rare H


 


Urine Mucus    Rare H














Assessment and Plan


Assessment: 


54-year-old Right-handed gentleman with medical history of Significant alcohol 

use, Right hip surgery, right knee surgery,urinary tract infection and tobacco 

use  and per medical records he has history of alcohol withdrawal who presented 

to the emergency department on 08/05/2024 left hip pain.  Revealed dislocation 

of the left prosthetic hip joint s/p closed reduction.  Neurology team consulted

 for altered mental status.  Upon seeing the patient he was alert and oriented 

X3 and was able to give me history.  His nurse felt he was alert, oriented X3.  

He does have Significant history of all call use as well as tobacco use on a 

daily basis.





Questionable encephalopathic A that was transient possibly could be due to his 

current post surgery---resolved


Possible old stroke and not acute (CT of the head showed subcenterimer 

hypodensity in the bilateral thalami which can be chronic and also old right 

frontal.  No acute ischemia)


Left his dislocation s/p reduction


Alcohol use


Tobacco use





Plan: 








-In the patient's mentation I feel the patient is alert oriented 3.  I felt 

possibly has transient encephalopathic to be due to his surgery that he just 

recently had an hospital stay he is back to his baseline according to the nurse 

she's not fluctuating with mentation-wise.   There is no need for an EEG at this

 time.  


-sodium is 133.  AST is 43 ALTs 28.  Urinalysis analysis last done on 08/05/20 

was unremarkable for any infection.  During his stay in the hospital he has not 

had any fever.


There is a vitamin B12 what is 623.  TSH was done on 07/23/20 and was normal 

(3.0 and the). 


-Will try to obtain MRI Brain to rule out acute stroke if patient is stable.  If

 unable because of surgery then recommend as outpatient.


-Recommend patient to be on ASA 81mg and Lipitor 20mg daily for secondary stroke

 prophylaxis. 


I ordered ammonia level.


-Patient was counseled to stop drinking all.


-He was counseled on tobacco cessation.


-The patient is currently on thiamine 100 mg daily.








Thanks for the consult





Jean Paul Burk MD


Neuro-hospitalist








Time with Patient: Greater than 30

## 2020-08-06 NOTE — CT
EXAMINATION TYPE: CT brain wo con

 

DATE OF EXAM: 8/6/2020

 

HISTORY: Memory issues.  Rule out vascular stroke

 

CT DLP: 1188.4 mGycm.  Automated Exposure Control for Dose Reduction was Utilized.

 

TECHNIQUE: CT scan of the head is performed without contrast.

 

COMPARISON: Memory issues. Concern for stroke.

 

FINDINGS:   

 

There is no acute intracranial hemorrhage, midline shift, or mass effect identified. There are subcen
timeter hypodensities of the bilateral thalami, which are age-indeterminate on the right (205:30) and
 appears more chronic on the left (205:29). There is also a 1 cm hypodensity of the right frontal lob
e which may represent old lacunar infarct.

 

The ventricles, sulci, and cisterns are normal in size and configuration.

 

No extra-axial fluid collection. Bones and extracranial soft tissues are intact. The globes are gross
ly symmetric. Visualized sinuses and mastoid air cells are clear.   

 

IMPRESSION:  

1. Subcentimeter hypodensities of the bilateral thalami may represent age-indeterminate infarct. Foll
ow-up with MRI is recommended. Additional hypodensity of the right frontal lobe appears chronic.

2. No acute intracranial hemorrhage, midline shift, or mass effect.

 

 

A [place] critical message alert has been initiated for Chintan Parra MD via the [employer] 
Critical Results System on 8/6/2020 8:27 AM.  This message alert has been sent to Chintan Parra MD v
ia the preferences provided by the clinician for the receipt of Radiology Critical Findings. Message 
ID [id number].

 

Dr. Hialy De Jesus discussed findings with patients floor KATTY Leong RN via the phone on 8/6/2020 
at 8:30 AM, and results were acknowledged.

## 2020-08-06 NOTE — P.PN
Subjective


Progress Note Date: 08/06/20


Principal diagnosis: 





Status post relocation of dislocated left total hip arthroplasty





Patient is evaluated today at bedside, his sister is also present at bedside.  

He is resting comfortably, is utilizing the abductor pillow.  He has no acute 

pain in the left hip at this time.  He was able to ambulate earlier today with 

no difficulties.  He is being followed by both internal medicine and neurology.





Objective





- Vital Signs


Vital signs: 


                                   Vital Signs











Temp  99.2 F   08/06/20 07:00


 


Pulse  93   08/06/20 07:00


 


Resp  19   08/06/20 07:00


 


BP  149/84   08/06/20 07:00


 


Pulse Ox  99   08/06/20 07:00








                                 Intake & Output











 08/05/20 08/06/20 08/06/20





 18:59 06:59 18:59


 


Intake Total 1200 1000 


 


Output Total 0 1200 


 


Balance 1200 -200 


 


Weight 115.2 kg  


 


Intake:   


 


    


 


  Intake, IV Titration 1000  





  Amount   


 


    Sodium Chloride 0.9% 1, 1000  





    000 ml @ 150 mls/hr IV .   





    Q6H40M Replaced by [facility] Rx#:[id number]   


 


  Oral  1000 


 


Output:   


 


  Urine  1200 


 


  Estimated Blood Loss 0  


 


Other:   


 


  Voiding Method  Urinal 


 


  # Voids  3 














- Exam





Left lower extremity:





Abductor pillows in good position and condition.  There is no significant 

ecchymosis or soft tissue swelling in the extremity.  Sensation to light touch 

throughout the extremities intact.  Painless range of motion with regards to 

internal and external rotation of the left hip.  Calf is soft, no tenderness 

with palpation.  Dorsalis pedis pulses 2+.





- Labs


CBC & Chem 7: 


                                 08/05/20 04:19





                                 08/05/20 04:19





Assessment and Plan


Assessment: 





Status post relocation of left total hip arthroplasty dislocation





Other medical comorbidities


Plan: 








Discussed with the patient's daughter the current condition with his left hip.  

She states that this is probably his fourth time dislocating.  He had his left 

hip replaced by Dr. Amadou Bruno [facility] about a year ago.  She states ever since

the surgery he's had continuous problems with the left hip.





Appreciate neurology recommendations at this time, nursing believes he will be 

scheduled for his MRI of the brain tomorrow.





Appreciate medical recommendations





With regards to the left hip, we recommend continuous use of the abductor pillow

while lying flat.  Also recommend weight-bear as tolerated with walker.  Total 

hip precautions were discussed with the patient and family today at bedside.  

Recommend follow-up in the outpatient setting with the original operating 

surgeon to discuss further treatment.  





Discussed with nursing to transition the admission to internal medicine at this 

time, no orthopedic surgical intervention recommended at this time.





We'll be available for any further questions regarding this patient.














Time with Patient: Less than 30

## 2020-08-06 NOTE — PN
PROGRESS NOTE



54-year-old white male with short-term memory problem, status post left hip

dislocation.



CARDIOVASCULAR: S1, S2.  Lungs clear.  GI soft. Neurologic: Alert and orient x 3.



ASSESSMENT:

1. Urinary tract infection.

2. Cognitive impairment, possibly secondary to alcohol.

CT shows possible strokes or MRI.





MMODL / IJN: [id number] / Job#: [id number]

## 2020-08-07 VITALS
RESPIRATION RATE: 18 BRPM | TEMPERATURE: 98.5 F | HEART RATE: 94 BPM | DIASTOLIC BLOOD PRESSURE: 77 MMHG | SYSTOLIC BLOOD PRESSURE: 136 MMHG

## 2020-08-07 RX ADMIN — FOLIC ACID SCH MG: 1 TABLET ORAL at 08:33

## 2020-08-07 RX ADMIN — HYDROMORPHONE HYDROCHLORIDE PRN MG: 1 INJECTION, SOLUTION INTRAMUSCULAR; INTRAVENOUS; SUBCUTANEOUS at 16:41

## 2020-08-07 RX ADMIN — CEFAZOLIN SCH: 330 INJECTION, POWDER, FOR SOLUTION INTRAMUSCULAR; INTRAVENOUS at 01:13

## 2020-08-07 RX ADMIN — THERA TABS SCH EACH: TAB at 08:31

## 2020-08-07 RX ADMIN — HYDROMORPHONE HYDROCHLORIDE PRN MG: 1 INJECTION, SOLUTION INTRAMUSCULAR; INTRAVENOUS; SUBCUTANEOUS at 08:31

## 2020-08-07 RX ADMIN — CEFAZOLIN SCH: 330 INJECTION, POWDER, FOR SOLUTION INTRAMUSCULAR; INTRAVENOUS at 12:38

## 2020-08-07 RX ADMIN — HYDROMORPHONE HYDROCHLORIDE PRN MG: 1 INJECTION, SOLUTION INTRAMUSCULAR; INTRAVENOUS; SUBCUTANEOUS at 05:25

## 2020-08-07 RX ADMIN — CEFAZOLIN SCH: 330 INJECTION, POWDER, FOR SOLUTION INTRAMUSCULAR; INTRAVENOUS at 17:53

## 2020-08-07 RX ADMIN — Medication SCH MG: at 08:31

## 2020-08-07 RX ADMIN — HYDROMORPHONE HYDROCHLORIDE PRN MG: 1 INJECTION, SOLUTION INTRAMUSCULAR; INTRAVENOUS; SUBCUTANEOUS at 01:41

## 2020-08-07 NOTE — MR
EXAMINATION TYPE: MR brain wo con

 

DATE OF EXAM: 8/7/2020

 

COMPARISON: CT brain 8/6/2020

 

HISTORY: Rule out stroke from CT head (thalamus)

 

CONTRAST:  Performed utilizing 0 mL intravenous Gadavist gadolinium contrast.  

 

TECHNIQUE: Multiplanar, multiecho imaging on a 3.0 Mirlande magnet is performed through the brain.  Stud
y is performed within 24 hours of arrival to the hospital.

 

The craniovertebral junction is normal.  The pituitary is normal.  

 

Diffusion-weighted imaging is performed.  No abnormal hyperintensity is present to suggest an acute i
ntracranial infarct or acute ischemic change.

 

There is an area of hyperintensity on T2-weighted sequences which is isointense on inversion recovery
 at the inferior right basal ganglion. This area corresponds to the abnormality on CT examination. Fi
ndings can be compatible with a Virchow-Oleg's space or less likely old lacunar infarct. No suspicio
us acute intracranial changes are evident.

 

There are patchy periventricular white matter hyperintensities which are nonspecific previously great
er than expected for the patient's age. Microvascular ischemic changes within the differential. Other
 etiologies would include vasculitis, multiple sclerosis, Lyme disease. Largest is above the right la
teral ventricle within the centrum semiovale measuring 0.7 cm. Series 801 image 22. Subcortical punct
ate white matter changes are evident.

 

Ventricles and sulci are appropriate for the patient age.

 

 

IMPRESSIONS:

1. Scattered periventricular and subcortical white matter changes, likely on the basis of chronic whi
te matter ischemic change. Differential diagnosis could include but is not limited to multiple sclero
sis, Lyme disease, vasculitis.

2. The 0.9 cm T2 hyperintensity within the inferior right basal ganglion corresponding to the CT abno
rmality can be compatible with a Virchow-Oleg's space or old lacunar infarct. No acute ischemic fink
ge at this level is identified.

## 2020-08-07 NOTE — DS
DISCHARGE SUMMARY



DISCHARGE MEDICATIONS:

1. Aspirin 81 mg daily.

2. Lipitor 40 mg daily.

3. Coreg 6.25 b.i.d.

4. Norvasc 5 mg daily.

5. Thiamine 100 mg daily.

6. Folic acid 1 mg daily.

7. Multivitamin 1 daily.



CONDITION:

Stable.



PROGNOSIS:

Guarded.



Ambulate as tolerated.



DISCHARGE DIAGNOSES:

1. Hip dislocation, left.

2. Hypokalemia.

3. Hypomagnesemia.

4. Hypertension.

5. Cerebrovascular accident.

Risk factor modification.  MRI and CT scan of the brain were worked up for memory loss.

He probably had a recent CVA or TIA due to uncontrolled hypertension and risk factors

before he came to the hospital.  He has no signs of infection here.  Risk factor

modification.  We added a statin, aspirin.  Continue blood pressure medicines. Alcohol

withdrawal. Withholding of any alcohol.  Follow up closely as an outpatient.  He will

be sent back for rehab. _____ [facility]. Diet is regular.





MMODL / IJN: [id number] / Job#: [id number]

## 2020-08-07 NOTE — P.PN
Subjective


Progress Note Date: 08/07/20


Principal diagnosis: 


Transient encephalopathy


Left hip dislocation s/p closed reduction





Patient was seen at bedside and was doing well.  Denies of any new weakness, 

numbness, visual disturbance.  He had no further change in mentation.








Objective





- Vital Signs


Vital signs: 


                                   Vital Signs











Temp  98.5 F   08/07/20 14:52


 


Pulse  94   08/07/20 14:52


 


Resp  18   08/07/20 16:00


 


BP  136/77   08/07/20 14:52


 


Pulse Ox  99   08/07/20 14:52








                                 Intake & Output











 08/07/20 08/07/20 08/08/20





 06:59 18:59 06:59


 


Intake Total 700 600 


 


Output Total 1900 650 


 


Balance -1200 -50 


 


Intake:   


 


  Intake, IV Titration  600 





  Amount   


 


    Sodium Chloride 0.9% 1,  600 





    000 ml @ 150 mls/hr IV .   





    Q6H40M [facility] Rx#:[id number]   


 


  Oral 700  


 


Output:   


 


  Urine 1900 650 


 


Other:   


 


  Voiding Method Urinal  


 


  # Voids 2 1 














- Exam


GENERAL: The patient is lying in bed and is not in acute distress.


CHEST: The heart rate is regular rate rhythm.   No murmurs to auscultation. 


LUNG: Clear to auscultation bilaterally no wheezing noted throughout.  Not 

labored breathing.


ABDOMEN/GI: Bowel sounds present in all 4 quadrants. No tenderness to palpation 

throughout.





NEUROLOGICAL:


Higher mental function: The patient is awake, alert, oriented to self, place and

time.  Able to identify objects such as pen, glassess and watch.   Patient is 

following simple and complex commands.  No aphasia and no neglect.


Cranial nerves: The pupils are round, equal and reactive to light and 

accommodation.  Visual fields are full to confrontation throughout.  Extraocular

movement is intact no nystagmus is noted.  Facial sensation is normal to touch 

throughout.  The facial strength is normal throughout.  Hearing is normal 

bilaterally to hand rub.  Tongue is midline and moved side-to-side without any 

difficulty.  No dysarthria is noted.  Shoulder shrug is normal bilaterally.


Motor: Gait is defered because of condition.  The strength is 5 over 5 

throughout bilateral upper extremities.  Not able to assess lower extremties 

because of current surgery.  He has intact good strength of bilateral feet 5/5. 

Normal tone and bulk.  


Cerebellum: Normal finger to nose bilaterally.


Sensation: Sensation is normal to touch throughout.


Reflexes (right/left):2+ in bilaterally upper extremities and not able to assess

lower extremities.


Plantars are downgoing bilaterally. 











- Labs


CBC & Chem 7: 


                                 08/05/20 04:19





                                 08/05/20 04:19





Assessment and Plan


Assessment: 


54-year-old Right-handed gentleman with medical history of Significant alcohol 

use, Right hip surgery, right knee surgery,urinary tract infection and tobacco 

use  and per medical records he has history of alcohol withdrawal who presented 

to the emergency department on 08/05/2024 left hip pain.  Revealed dislocation 

of the left prosthetic hip joint s/p closed reduction.  Neurology team consulted

for altered mental status.  Upon seeing the patient he was alert and oriented X3

and was able to give me history.  His nurse felt he was alert, oriented X3.  He 

does have Significant history of all call use as well as tobacco use on a daily 

basis.





Questionable encephalopathic A that was transient possibly could be due to his 

current post surgery---resolved


No acute stroke


Left his dislocation s/p reduction


Alcohol use


Tobacco use





Plan: 








-The patient's mentation, I feel the patient is alert oriented 3.  I felt 

possibly has transient encephalopathic to be due to his surgery that he just 

recently had an hospital stay he is back to his baseline according to the nurse 

she's not fluctuating with mentation-wise.   There is no need for an EEG at this

time.  


-sodium is 133.  AST is 43 ALTs 28.  Urinalysis analysis last done on 08/05/20 

was unremarkable for any infection.  During his stay in the hospital he has not 

had any fever.


There is a vitamin B12 what is 623.  TSH was done on 07/23/20 and was normal 

(3.0 and the). 


Ammonia level <9.


CT of the head showed subcenterimer hypodensity in the bilateral thalami which 

can be chronic and also old right frontal.  No acute ischemia


-MRI Brain:  Scattered periventricular and subcortical white matter changes, 

likely on the basis of chronic white matter change.  There have there is 

hyperintensity on the T2 over the right basal ganglia possibly old lacunar 

infarct.  


-Continue ASA 81mg and Lipitor 40mg daily for secondary stroke prophylaxis. 


-Patient was counseled to stop drinking all.


-He was counseled on tobacco cessation.


-The patient is currently on thiamine 100 mg daily.





The patient is clear from neurology team.  Please reconsult if needed.





Jean Paul Burk MD


Neuro-hospitalist








Time with Patient: Less than 30

## 2020-08-11 NOTE — CDI
Documentation Clarification Form

Date: 8/10/20

From: Radha Ballard

Phone: If you have a question about this query, please contact Naomi Boston, 
 at [phone] between 8am and 5pm.

Admit Date: 8/5/20

Discharge Date: 8/7/20

Patient Name: TYRELL KING

Visit Number: [id number]



ATTENTION: The Clinical Documentation Specialists (CDI) and [facility] Coding Staff 
appreciate your assistance in clarifying documentation. Please respond to the 
clarification below the line at the bottom and electronically sign. The CDI & 
[facility] Coding staff will review the response and follow-up if needed. Please note: 
Queries are made part of the Legal Health Record. If you have any questions, 
please contact the author of this message via ITS.



Dear Dr. Uziel Hart,



Conflicting documentation has been found in the medical record:

Per discharge summary, documented hypomagesemia and hypokalemia.

No magnesium lab results available in view chart.

Potassium lab results are 4.0 (range is 3.5/5.1).

History/Risk Factors: dislocation of left hip prosthesis, postop delirium, 
alcohol dependence w transient encephalopathy, HTN

Treatment: Did not receive magnesium or potassium



In your opinion, what is the most clinically appropriate diagnosis for this 
patient?  

Hypomagesemia and hypokalemia ruled out

Hypomagesemia and hypokalemia confirmed

Other explanation of clinical findings

Unable to determine (no explanation for clinical findings)

___________________________________________________________________________

MTDD

## 2020-08-14 NOTE — CDI
Documentation Clarification Form

Date: 8/10/20

From: Radha Ballard

Phone: If you have a question about this query, please contact Naomi Boston, 
 at [phone] between 8am and 5pm.

Admit Date: 8/5/20

Discharge Date: 8/7/20

Patient Name: TYRELL KING

Visit Number: [id number]



ATTENTION: The Clinical Documentation Specialists (CDI) and [facility] Coding Staff 
appreciate your assistance in clarifying documentation. Please respond to the 
clarification below the line at the bottom and electronically sign. The CDI & 
[facility] Coding staff will review the response and follow-up if needed. Please note: 
Queries are made part of the Legal Health Record. If you have any questions, 
please contact the author of this message via ITS.



Dear Dr. Uziel Hart,



Conflicting documentation has been found in the medical record:

Per discharge summary, documented hypomagesemia and hypokalemia.

No magnesium lab results available in view chart.

Potassium lab results are 4.0 (range is 3.5/5.1).

History/Risk Factors: dislocation of left hip prosthesis, postop delirium, 
alcohol dependence w transient encephalopathy, HTN

Treatment: Did not receive magnesium or potassium



In your opinion, what is the most clinically appropriate diagnosis for this 
patient?  



Hypomagesemia and hypokalemia ruled out

Hypomagesemia and hypokalemia confirmed

Other explanation of clinical findings

Unable to determine (no explanation for clinical findings)

___________________________________________________________________________

MTDD

## 2020-08-17 NOTE — PN
PROGRESS NOTE



The patient was admitted with hypomagnesemia and hypokalemia.





MMWILLIAM / ARELISN: [id number] / Job#: [id number]